# Patient Record
Sex: FEMALE | Race: WHITE | NOT HISPANIC OR LATINO | Employment: OTHER | URBAN - NONMETROPOLITAN AREA
[De-identification: names, ages, dates, MRNs, and addresses within clinical notes are randomized per-mention and may not be internally consistent; named-entity substitution may affect disease eponyms.]

---

## 2017-01-01 ENCOUNTER — APPOINTMENT (OUTPATIENT)
Dept: CARDIOLOGY | Facility: HOSPITAL | Age: 77
End: 2017-01-01
Attending: INTERNAL MEDICINE

## 2017-01-01 ENCOUNTER — HOSPITAL ENCOUNTER (INPATIENT)
Facility: HOSPITAL | Age: 77
LOS: 6 days | End: 2017-12-31
Attending: EMERGENCY MEDICINE | Admitting: INTERNAL MEDICINE

## 2017-01-01 ENCOUNTER — HOSPITAL ENCOUNTER (INPATIENT)
Facility: HOSPITAL | Age: 77
LOS: 6 days | Discharge: NURSING, REHAB OR LONG-TERM CARE | End: 2017-12-22
Attending: EMERGENCY MEDICINE | Admitting: INTERNAL MEDICINE

## 2017-01-01 ENCOUNTER — APPOINTMENT (OUTPATIENT)
Dept: GENERAL RADIOLOGY | Facility: HOSPITAL | Age: 77
End: 2017-01-01

## 2017-01-01 ENCOUNTER — APPOINTMENT (OUTPATIENT)
Dept: CT IMAGING | Facility: HOSPITAL | Age: 77
End: 2017-01-01

## 2017-01-01 VITALS
SYSTOLIC BLOOD PRESSURE: 134 MMHG | DIASTOLIC BLOOD PRESSURE: 61 MMHG | TEMPERATURE: 98.1 F | WEIGHT: 101.2 LBS | HEIGHT: 64 IN | RESPIRATION RATE: 21 BRPM | OXYGEN SATURATION: 91 % | BODY MASS INDEX: 17.28 KG/M2 | HEART RATE: 76 BPM

## 2017-01-01 VITALS
OXYGEN SATURATION: 88 % | BODY MASS INDEX: 17.42 KG/M2 | HEIGHT: 66 IN | HEART RATE: 76 BPM | DIASTOLIC BLOOD PRESSURE: 39 MMHG | RESPIRATION RATE: 16 BRPM | SYSTOLIC BLOOD PRESSURE: 113 MMHG | TEMPERATURE: 98.6 F | WEIGHT: 108.4 LBS

## 2017-01-01 DIAGNOSIS — J44.1 COPD EXACERBATION (HCC): Primary | ICD-10-CM

## 2017-01-01 DIAGNOSIS — R13.12 OROPHARYNGEAL DYSPHAGIA: ICD-10-CM

## 2017-01-01 DIAGNOSIS — J96.01 ACUTE RESPIRATORY FAILURE WITH HYPOXEMIA (HCC): Primary | ICD-10-CM

## 2017-01-01 DIAGNOSIS — Z74.09 IMPAIRED MOBILITY: ICD-10-CM

## 2017-01-01 LAB
ALBUMIN SERPL-MCNC: 3.4 G/DL (ref 3.5–5)
ALBUMIN SERPL-MCNC: 3.5 G/DL (ref 3.5–5)
ALBUMIN SERPL-MCNC: 3.6 G/DL (ref 3.5–5)
ALBUMIN SERPL-MCNC: 3.9 G/DL (ref 3.5–5)
ALBUMIN/GLOB SERPL: 1.2 G/DL (ref 1.1–2.5)
ALBUMIN/GLOB SERPL: 1.2 G/DL (ref 1.1–2.5)
ALBUMIN/GLOB SERPL: 1.3 G/DL (ref 1.1–2.5)
ALBUMIN/GLOB SERPL: 1.4 G/DL (ref 1.1–2.5)
ALP SERPL-CCNC: 55 U/L (ref 24–120)
ALP SERPL-CCNC: 55 U/L (ref 24–120)
ALP SERPL-CCNC: 69 U/L (ref 24–120)
ALP SERPL-CCNC: 78 U/L (ref 24–120)
ALT SERPL W P-5'-P-CCNC: 38 U/L (ref 0–54)
ALT SERPL W P-5'-P-CCNC: 41 U/L (ref 0–54)
ALT SERPL W P-5'-P-CCNC: 45 U/L (ref 0–54)
ALT SERPL W P-5'-P-CCNC: 48 U/L (ref 0–54)
ANION GAP SERPL CALCULATED.3IONS-SCNC: 7 MMOL/L (ref 4–13)
ANION GAP SERPL CALCULATED.3IONS-SCNC: 8 MMOL/L (ref 4–13)
ANION GAP SERPL CALCULATED.3IONS-SCNC: 8 MMOL/L (ref 4–13)
ANION GAP SERPL CALCULATED.3IONS-SCNC: ABNORMAL MMOL/L (ref 4–13)
APTT PPP: 29.5 SECONDS (ref 24.1–34.8)
ARTERIAL PATENCY WRIST A: POSITIVE
AST SERPL-CCNC: 24 U/L (ref 7–45)
AST SERPL-CCNC: 24 U/L (ref 7–45)
AST SERPL-CCNC: 29 U/L (ref 7–45)
AST SERPL-CCNC: 34 U/L (ref 7–45)
ATMOSPHERIC PRESS: 754 MMHG
ATMOSPHERIC PRESS: 754 MMHG
ATMOSPHERIC PRESS: 755 MMHG
ATMOSPHERIC PRESS: 762 MMHG
ATMOSPHERIC PRESS: 764 MMHG
BACTERIA SPEC AEROBE CULT: NORMAL
BACTERIA SPEC RESP CULT: ABNORMAL
BASE EXCESS BLDA CALC-SCNC: 10.1 MMOL/L (ref 0–2)
BASE EXCESS BLDA CALC-SCNC: 18.1 MMOL/L (ref 0–2)
BASE EXCESS BLDA CALC-SCNC: 22.8 MMOL/L (ref 0–2)
BASE EXCESS BLDA CALC-SCNC: 6.1 MMOL/L (ref 0–2)
BASE EXCESS BLDA CALC-SCNC: 6.4 MMOL/L (ref 0–2)
BASOPHILS # BLD AUTO: 0.01 10*3/MM3 (ref 0–0.2)
BASOPHILS # BLD AUTO: 0.04 10*3/MM3 (ref 0–0.2)
BASOPHILS # BLD AUTO: 0.06 10*3/MM3 (ref 0–0.2)
BASOPHILS # BLD AUTO: 0.08 10*3/MM3 (ref 0–0.2)
BASOPHILS NFR BLD AUTO: 0.1 % (ref 0–2)
BASOPHILS NFR BLD AUTO: 0.3 % (ref 0–2)
BASOPHILS NFR BLD AUTO: 0.4 % (ref 0–2)
BASOPHILS NFR BLD AUTO: 0.4 % (ref 0–2)
BDY SITE: ABNORMAL
BH CV ECHO MEAS - AO MAX PG (FULL): 0.38 MMHG
BH CV ECHO MEAS - AO MAX PG: 4 MMHG
BH CV ECHO MEAS - AO MEAN PG (FULL): 0 MMHG
BH CV ECHO MEAS - AO MEAN PG: 2 MMHG
BH CV ECHO MEAS - AO ROOT AREA (BSA CORRECTED): 2.6
BH CV ECHO MEAS - AO ROOT AREA: 10.2 CM^2
BH CV ECHO MEAS - AO ROOT DIAM: 3.6 CM
BH CV ECHO MEAS - AO V2 MAX: 100 CM/SEC
BH CV ECHO MEAS - AO V2 MEAN: 67.3 CM/SEC
BH CV ECHO MEAS - AO V2 VTI: 19.7 CM
BH CV ECHO MEAS - AVA(I,A): 2.4 CM^2
BH CV ECHO MEAS - AVA(I,D): 2.4 CM^2
BH CV ECHO MEAS - AVA(V,A): 2.4 CM^2
BH CV ECHO MEAS - AVA(V,D): 2.4 CM^2
BH CV ECHO MEAS - BSA(HAYCOCK): 1.3 M^2
BH CV ECHO MEAS - BSA: 1.4 M^2
BH CV ECHO MEAS - BZI_BMI: 13.4 KILOGRAMS/M^2
BH CV ECHO MEAS - BZI_METRIC_HEIGHT: 167.6 CM
BH CV ECHO MEAS - BZI_METRIC_WEIGHT: 37.6 KG
BH CV ECHO MEAS - CONTRAST EF 4CH: 73.8 ML/M^2
BH CV ECHO MEAS - EDV(CUBED): 24.4 ML
BH CV ECHO MEAS - EDV(MOD-SP4): 26.7 ML
BH CV ECHO MEAS - EDV(TEICH): 32.2 ML
BH CV ECHO MEAS - EF(CUBED): 76.1 %
BH CV ECHO MEAS - EF(MOD-SP4): 73.8 %
BH CV ECHO MEAS - EF(TEICH): 69.8 %
BH CV ECHO MEAS - ESV(CUBED): 5.8 ML
BH CV ECHO MEAS - ESV(MOD-SP4): 7 ML
BH CV ECHO MEAS - ESV(TEICH): 9.7 ML
BH CV ECHO MEAS - FS: 37.9 %
BH CV ECHO MEAS - IVS/LVPW: 1
BH CV ECHO MEAS - IVSD: 0.9 CM
BH CV ECHO MEAS - LA DIMENSION: 2.8 CM
BH CV ECHO MEAS - LA/AO: 0.78
BH CV ECHO MEAS - LAT PEAK E' VEL: 8.7 CM/SEC
BH CV ECHO MEAS - LV DIASTOLIC VOL/BSA (35-75): 19.4 ML/M^2
BH CV ECHO MEAS - LV MASS(C)D: 66.7 GRAMS
BH CV ECHO MEAS - LV MASS(C)DI: 48.5 GRAMS/M^2
BH CV ECHO MEAS - LV MAX PG: 3.6 MMHG
BH CV ECHO MEAS - LV MEAN PG: 2 MMHG
BH CV ECHO MEAS - LV SYSTOLIC VOL/BSA (12-30): 5.1 ML/M^2
BH CV ECHO MEAS - LV V1 MAX: 95.1 CM/SEC
BH CV ECHO MEAS - LV V1 MEAN: 72 CM/SEC
BH CV ECHO MEAS - LV V1 VTI: 18.4 CM
BH CV ECHO MEAS - LVIDD: 2.9 CM
BH CV ECHO MEAS - LVIDS: 1.8 CM
BH CV ECHO MEAS - LVLD AP4: 6.1 CM
BH CV ECHO MEAS - LVLS AP4: 4.2 CM
BH CV ECHO MEAS - LVOT AREA (M): 2.5 CM^2
BH CV ECHO MEAS - LVOT AREA: 2.5 CM^2
BH CV ECHO MEAS - LVOT DIAM: 1.8 CM
BH CV ECHO MEAS - LVPWD: 0.9 CM
BH CV ECHO MEAS - MED PEAK E' VEL: 5.77 CM/SEC
BH CV ECHO MEAS - MV A MAX VEL: 53 CM/SEC
BH CV ECHO MEAS - MV DEC TIME: 0.24 SEC
BH CV ECHO MEAS - MV E MAX VEL: 70.1 CM/SEC
BH CV ECHO MEAS - MV E/A: 1.3
BH CV ECHO MEAS - PI END-D VEL: 224 CM/SEC
BH CV ECHO MEAS - RAP SYSTOLE: 5 MMHG
BH CV ECHO MEAS - RVSP: 90.7 MMHG
BH CV ECHO MEAS - SI(AO): 145.7 ML/M^2
BH CV ECHO MEAS - SI(CUBED): 13.5 ML/M^2
BH CV ECHO MEAS - SI(LVOT): 34 ML/M^2
BH CV ECHO MEAS - SI(MOD-SP4): 14.3 ML/M^2
BH CV ECHO MEAS - SI(TEICH): 16.3 ML/M^2
BH CV ECHO MEAS - SV(AO): 200.5 ML
BH CV ECHO MEAS - SV(CUBED): 18.6 ML
BH CV ECHO MEAS - SV(LVOT): 46.8 ML
BH CV ECHO MEAS - SV(MOD-SP4): 19.7 ML
BH CV ECHO MEAS - SV(TEICH): 22.5 ML
BH CV ECHO MEAS - TR MAX VEL: 463 CM/SEC
BILIRUB SERPL-MCNC: 0.5 MG/DL (ref 0.1–1)
BILIRUB SERPL-MCNC: 0.9 MG/DL (ref 0.1–1)
BILIRUB SERPL-MCNC: 1 MG/DL (ref 0.1–1)
BILIRUB SERPL-MCNC: 1.4 MG/DL (ref 0.1–1)
BODY TEMPERATURE: 37 C
BUN BLD-MCNC: 17 MG/DL (ref 5–21)
BUN BLD-MCNC: 18 MG/DL (ref 5–21)
BUN BLD-MCNC: 28 MG/DL (ref 5–21)
BUN BLD-MCNC: 34 MG/DL (ref 5–21)
BUN BLD-MCNC: 35 MG/DL (ref 5–21)
BUN BLD-MCNC: 37 MG/DL (ref 5–21)
BUN/CREAT SERPL: 25.8 (ref 7–25)
BUN/CREAT SERPL: 26.9 (ref 7–25)
BUN/CREAT SERPL: 36.8 (ref 7–25)
BUN/CREAT SERPL: 47.4 (ref 7–25)
BUN/CREAT SERPL: 59.3 (ref 7–25)
BUN/CREAT SERPL: 64.2 (ref 7–25)
CALCIUM SPEC-SCNC: 8.7 MG/DL (ref 8.4–10.4)
CALCIUM SPEC-SCNC: 8.7 MG/DL (ref 8.4–10.4)
CALCIUM SPEC-SCNC: 8.8 MG/DL (ref 8.4–10.4)
CALCIUM SPEC-SCNC: 8.8 MG/DL (ref 8.4–10.4)
CALCIUM SPEC-SCNC: 9.3 MG/DL (ref 8.4–10.4)
CALCIUM SPEC-SCNC: 9.5 MG/DL (ref 8.4–10.4)
CHLORIDE SERPL-SCNC: 82 MMOL/L (ref 98–110)
CHLORIDE SERPL-SCNC: 85 MMOL/L (ref 98–110)
CHLORIDE SERPL-SCNC: 88 MMOL/L (ref 98–110)
CHLORIDE SERPL-SCNC: 93 MMOL/L (ref 98–110)
CHLORIDE SERPL-SCNC: 94 MMOL/L (ref 98–110)
CHLORIDE SERPL-SCNC: 96 MMOL/L (ref 98–110)
CK MB SERPL-CCNC: 1.27 NG/ML (ref 0–5)
CO2 SERPL-SCNC: 35 MMOL/L (ref 24–31)
CO2 SERPL-SCNC: 36 MMOL/L (ref 24–31)
CO2 SERPL-SCNC: 39 MMOL/L (ref 24–31)
CO2 SERPL-SCNC: >40 MMOL/L (ref 24–31)
COHGB MFR BLD: 2.5 % (ref 0–5)
CREAT BLD-MCNC: 0.53 MG/DL (ref 0.5–1.4)
CREAT BLD-MCNC: 0.59 MG/DL (ref 0.5–1.4)
CREAT BLD-MCNC: 0.66 MG/DL (ref 0.5–1.4)
CREAT BLD-MCNC: 0.67 MG/DL (ref 0.5–1.4)
CREAT BLD-MCNC: 0.76 MG/DL (ref 0.5–1.4)
CREAT BLD-MCNC: 0.78 MG/DL (ref 0.5–1.4)
D-LACTATE SERPL-SCNC: 1.4 MMOL/L (ref 0.5–2)
D-LACTATE SERPL-SCNC: 1.8 MMOL/L (ref 0.5–2)
DEPRECATED RDW RBC AUTO: 47.8 FL (ref 40–54)
DEPRECATED RDW RBC AUTO: 49.1 FL (ref 40–54)
DEPRECATED RDW RBC AUTO: 51.5 FL (ref 40–54)
DEPRECATED RDW RBC AUTO: 51.9 FL (ref 40–54)
E/E' RATIO: 12.1
EOSINOPHIL # BLD AUTO: 0 10*3/MM3 (ref 0–0.7)
EOSINOPHIL # BLD AUTO: 0.02 10*3/MM3 (ref 0–0.7)
EOSINOPHIL NFR BLD AUTO: 0 % (ref 0–4)
EOSINOPHIL NFR BLD AUTO: 0.2 % (ref 0–4)
EPAP: 6
ERYTHROCYTE [DISTWIDTH] IN BLOOD BY AUTOMATED COUNT: 14 % (ref 12–15)
ERYTHROCYTE [DISTWIDTH] IN BLOOD BY AUTOMATED COUNT: 14.1 % (ref 12–15)
ERYTHROCYTE [DISTWIDTH] IN BLOOD BY AUTOMATED COUNT: 14.5 % (ref 12–15)
ERYTHROCYTE [DISTWIDTH] IN BLOOD BY AUTOMATED COUNT: 14.5 % (ref 12–15)
GAS FLOW AIRWAY: 4.5 LPM
GAS FLOW AIRWAY: 6 LPM
GFR SERPL CREATININE-BSD FRML MDRD: 111 ML/MIN/1.73
GFR SERPL CREATININE-BSD FRML MDRD: 71 ML/MIN/1.73
GFR SERPL CREATININE-BSD FRML MDRD: 73 ML/MIN/1.73
GFR SERPL CREATININE-BSD FRML MDRD: 85 ML/MIN/1.73
GFR SERPL CREATININE-BSD FRML MDRD: 86 ML/MIN/1.73
GFR SERPL CREATININE-BSD FRML MDRD: 98 ML/MIN/1.73
GLOBULIN UR ELPH-MCNC: 2.7 GM/DL
GLOBULIN UR ELPH-MCNC: 2.8 GM/DL
GLOBULIN UR ELPH-MCNC: 2.9 GM/DL
GLOBULIN UR ELPH-MCNC: 3.1 GM/DL
GLUCOSE BLD-MCNC: 123 MG/DL (ref 70–100)
GLUCOSE BLD-MCNC: 130 MG/DL (ref 70–100)
GLUCOSE BLD-MCNC: 145 MG/DL (ref 70–100)
GLUCOSE BLD-MCNC: 154 MG/DL (ref 70–100)
GLUCOSE BLD-MCNC: 169 MG/DL (ref 70–100)
GLUCOSE BLD-MCNC: 177 MG/DL (ref 70–100)
GRAM STN SPEC: ABNORMAL
HCO3 BLDA-SCNC: 33 MMOL/L (ref 20–26)
HCO3 BLDA-SCNC: 33.1 MMOL/L (ref 20–26)
HCO3 BLDA-SCNC: 38.4 MMOL/L (ref 20–26)
HCO3 BLDA-SCNC: 46.8 MMOL/L (ref 20–26)
HCO3 BLDA-SCNC: 50.1 MMOL/L (ref 20–26)
HCT VFR BLD AUTO: 37.1 % (ref 37–47)
HCT VFR BLD AUTO: 38 % (ref 37–47)
HCT VFR BLD AUTO: 42 % (ref 37–47)
HCT VFR BLD AUTO: 43 % (ref 37–47)
HCT VFR BLD CALC: 45.3 % (ref 38–51)
HGB BLD-MCNC: 12 G/DL (ref 12–16)
HGB BLD-MCNC: 13 G/DL (ref 12–16)
HGB BLD-MCNC: 13.8 G/DL (ref 12–16)
HGB BLD-MCNC: 14.8 G/DL (ref 12–16)
HGB BLDA-MCNC: 14.8 G/DL (ref 13.5–17.5)
HOLD SPECIMEN: NORMAL
HOLD SPECIMEN: NORMAL
HOROWITZ INDEX BLD+IHG-RTO: 35 %
HOROWITZ INDEX BLD+IHG-RTO: 50 %
HOROWITZ INDEX BLD+IHG-RTO: 70 %
IMM GRANULOCYTES # BLD: 0.04 10*3/MM3 (ref 0–0.03)
IMM GRANULOCYTES # BLD: 0.05 10*3/MM3 (ref 0–0.03)
IMM GRANULOCYTES # BLD: 0.11 10*3/MM3 (ref 0–0.03)
IMM GRANULOCYTES # BLD: 0.15 10*3/MM3 (ref 0–0.03)
IMM GRANULOCYTES NFR BLD: 0.4 % (ref 0–5)
IMM GRANULOCYTES NFR BLD: 0.5 % (ref 0–5)
IMM GRANULOCYTES NFR BLD: 0.6 % (ref 0–5)
IMM GRANULOCYTES NFR BLD: 0.7 % (ref 0–5)
INR PPP: 0.99 (ref 0.91–1.09)
INR PPP: 1.04 (ref 0.91–1.09)
IPAP: 12
LEFT ATRIUM VOLUME INDEX: 23.2 ML/M2
LEFT ATRIUM VOLUME: 32 CM3
LV EF 2D ECHO EST: 65 %
LYMPHOCYTES # BLD AUTO: 0.1 10*3/MM3 (ref 0.72–4.86)
LYMPHOCYTES # BLD AUTO: 0.18 10*3/MM3 (ref 0.72–4.86)
LYMPHOCYTES # BLD AUTO: 0.3 10*3/MM3 (ref 0.72–4.86)
LYMPHOCYTES # BLD AUTO: 0.65 10*3/MM3 (ref 0.72–4.86)
LYMPHOCYTES NFR BLD AUTO: 0.5 % (ref 15–45)
LYMPHOCYTES NFR BLD AUTO: 2.2 % (ref 15–45)
LYMPHOCYTES NFR BLD AUTO: 2.7 % (ref 15–45)
LYMPHOCYTES NFR BLD AUTO: 2.9 % (ref 15–45)
Lab: ABNORMAL
MAGNESIUM SERPL-MCNC: 1.6 MG/DL (ref 1.4–2.2)
MAXIMAL PREDICTED HEART RATE: 141 BPM
MCH RBC QN AUTO: 31.6 PG (ref 28–32)
MCH RBC QN AUTO: 31.7 PG (ref 28–32)
MCH RBC QN AUTO: 32.2 PG (ref 28–32)
MCH RBC QN AUTO: 32.7 PG (ref 28–32)
MCHC RBC AUTO-ENTMCNC: 32.3 G/DL (ref 33–36)
MCHC RBC AUTO-ENTMCNC: 32.9 G/DL (ref 33–36)
MCHC RBC AUTO-ENTMCNC: 34.2 G/DL (ref 33–36)
MCHC RBC AUTO-ENTMCNC: 34.4 G/DL (ref 33–36)
MCV RBC AUTO: 93.5 FL (ref 82–98)
MCV RBC AUTO: 95.5 FL (ref 82–98)
MCV RBC AUTO: 96.6 FL (ref 82–98)
MCV RBC AUTO: 97.6 FL (ref 82–98)
METHGB BLD QL: 0.7 % (ref 0–3)
MODALITY: ABNORMAL
MONOCYTES # BLD AUTO: 0.21 10*3/MM3 (ref 0.19–1.3)
MONOCYTES # BLD AUTO: 0.38 10*3/MM3 (ref 0.19–1.3)
MONOCYTES # BLD AUTO: 0.73 10*3/MM3 (ref 0.19–1.3)
MONOCYTES # BLD AUTO: 1.37 10*3/MM3 (ref 0.19–1.3)
MONOCYTES NFR BLD AUTO: 1.8 % (ref 4–12)
MONOCYTES NFR BLD AUTO: 2.6 % (ref 4–12)
MONOCYTES NFR BLD AUTO: 6.1 % (ref 4–12)
MONOCYTES NFR BLD AUTO: 6.7 % (ref 4–12)
NEUTROPHILS # BLD AUTO: 20.09 10*3/MM3 (ref 1.87–8.4)
NEUTROPHILS # BLD AUTO: 20.24 10*3/MM3 (ref 1.87–8.4)
NEUTROPHILS # BLD AUTO: 7.73 10*3/MM3 (ref 1.87–8.4)
NEUTROPHILS # BLD AUTO: 9.81 10*3/MM3 (ref 1.87–8.4)
NEUTROPHILS NFR BLD AUTO: 89.6 % (ref 39–78)
NEUTROPHILS NFR BLD AUTO: 89.9 % (ref 39–78)
NEUTROPHILS NFR BLD AUTO: 94.5 % (ref 39–78)
NEUTROPHILS NFR BLD AUTO: 96.9 % (ref 39–78)
NOTIFIED BY: ABNORMAL
NOTIFIED WHO: ABNORMAL
NRBC BLD MANUAL-RTO: 0 /100 WBC (ref 0–0)
NT-PROBNP SERPL-MCNC: 3880 PG/ML (ref 0–1800)
NT-PROBNP SERPL-MCNC: 4670 PG/ML (ref 0–1800)
OXYHGB MFR BLDV: 71.8 % (ref 94–99)
PCO2 BLDA: 54 MM HG (ref 35–45)
PCO2 BLDA: 56.5 MM HG (ref 35–45)
PCO2 BLDA: 61.6 MM HG (ref 35–45)
PCO2 BLDA: 71.1 MM HG (ref 35–45)
PCO2 BLDA: 71.4 MM HG (ref 35–45)
PH BLDA: 7.34 PH UNITS (ref 7.35–7.45)
PH BLDA: 7.38 PH UNITS (ref 7.35–7.45)
PH BLDA: 7.39 PH UNITS (ref 7.35–7.45)
PH BLDA: 7.42 PH UNITS (ref 7.35–7.45)
PH BLDA: 7.52 PH UNITS (ref 7.35–7.45)
PHOSPHATE SERPL-MCNC: 3.3 MG/DL (ref 2.5–4.5)
PLATELET # BLD AUTO: 121 10*3/MM3 (ref 130–400)
PLATELET # BLD AUTO: 148 10*3/MM3 (ref 130–400)
PLATELET # BLD AUTO: 166 10*3/MM3 (ref 130–400)
PLATELET # BLD AUTO: 210 10*3/MM3 (ref 130–400)
PMV BLD AUTO: 10.7 FL (ref 6–12)
PMV BLD AUTO: 10.8 FL (ref 6–12)
PMV BLD AUTO: 10.9 FL (ref 6–12)
PMV BLD AUTO: 11 FL (ref 6–12)
PO2 BLDA: 38.8 MM HG (ref 83–108)
PO2 BLDA: 52.7 MM HG (ref 83–108)
PO2 BLDA: 72.5 MM HG (ref 83–108)
PO2 BLDA: 74.5 MM HG (ref 83–108)
PO2 BLDA: 77.9 MM HG (ref 83–108)
POTASSIUM BLD-SCNC: 3.6 MMOL/L (ref 3.5–5.3)
POTASSIUM BLD-SCNC: 3.9 MMOL/L (ref 3.5–5.3)
POTASSIUM BLD-SCNC: 4.1 MMOL/L (ref 3.5–5.3)
POTASSIUM BLD-SCNC: 4.2 MMOL/L (ref 3.5–5.3)
POTASSIUM BLD-SCNC: 4.7 MMOL/L (ref 3.5–5.3)
POTASSIUM BLD-SCNC: 4.9 MMOL/L (ref 3.5–5.3)
POTASSIUM BLDA-SCNC: 3.8 MMOL/L (ref 3.5–5.2)
PROT SERPL-MCNC: 6.2 G/DL (ref 6.3–8.7)
PROT SERPL-MCNC: 6.3 G/DL (ref 6.3–8.7)
PROT SERPL-MCNC: 6.7 G/DL (ref 6.3–8.7)
PROT SERPL-MCNC: 6.7 G/DL (ref 6.3–8.7)
PROTHROMBIN TIME: 13.4 SECONDS (ref 11.9–14.6)
PROTHROMBIN TIME: 13.9 SECONDS (ref 11.9–14.6)
RBC # BLD AUTO: 3.8 10*6/MM3 (ref 4.2–5.4)
RBC # BLD AUTO: 3.98 10*6/MM3 (ref 4.2–5.4)
RBC # BLD AUTO: 4.35 10*6/MM3 (ref 4.2–5.4)
RBC # BLD AUTO: 4.6 10*6/MM3 (ref 4.2–5.4)
SAO2 % BLDCOA: 74.2 % (ref 94–99)
SAO2 % BLDCOA: 85.2 % (ref 94–99)
SAO2 % BLDCOA: 94.5 % (ref 94–99)
SAO2 % BLDCOA: 94.8 % (ref 94–99)
SAO2 % BLDCOA: 95.8 % (ref 94–99)
SET MECH RESP RATE: 12
SET MECH RESP RATE: 14
SET MECH RESP RATE: 14
SODIUM BLD-SCNC: 136 MMOL/L (ref 135–145)
SODIUM BLD-SCNC: 138 MMOL/L (ref 135–145)
SODIUM BLD-SCNC: 139 MMOL/L (ref 135–145)
SODIUM BLD-SCNC: 139 MMOL/L (ref 135–145)
SODIUM BLD-SCNC: 141 MMOL/L (ref 135–145)
SODIUM BLD-SCNC: 142 MMOL/L (ref 135–145)
SODIUM BLDA-SCNC: 136 MMOL/L (ref 136–145)
STRESS TARGET HR: 120 BPM
TROPONIN I SERPL-MCNC: 0.05 NG/ML (ref 0–0.03)
TROPONIN I SERPL-MCNC: 0.07 NG/ML (ref 0–0.03)
VENTILATOR MODE: ABNORMAL
WBC NRBC COR # BLD: 10.94 10*3/MM3 (ref 4.8–10.8)
WBC NRBC COR # BLD: 20.74 10*3/MM3 (ref 4.8–10.8)
WBC NRBC COR # BLD: 22.49 10*3/MM3 (ref 4.8–10.8)
WBC NRBC COR # BLD: 8.18 10*3/MM3 (ref 4.8–10.8)
WHOLE BLOOD HOLD SPECIMEN: NORMAL
WHOLE BLOOD HOLD SPECIMEN: NORMAL

## 2017-01-01 PROCEDURE — 94660 CPAP INITIATION&MGMT: CPT

## 2017-01-01 PROCEDURE — 94799 UNLISTED PULMONARY SVC/PX: CPT

## 2017-01-01 PROCEDURE — 25010000002 ENOXAPARIN PER 10 MG: Performed by: FAMILY MEDICINE

## 2017-01-01 PROCEDURE — 94640 AIRWAY INHALATION TREATMENT: CPT

## 2017-01-01 PROCEDURE — 82803 BLOOD GASES ANY COMBINATION: CPT

## 2017-01-01 PROCEDURE — 87205 SMEAR GRAM STAIN: CPT | Performed by: INTERNAL MEDICINE

## 2017-01-01 PROCEDURE — 97161 PT EVAL LOW COMPLEX 20 MIN: CPT

## 2017-01-01 PROCEDURE — 83735 ASSAY OF MAGNESIUM: CPT | Performed by: FAMILY MEDICINE

## 2017-01-01 PROCEDURE — 97116 GAIT TRAINING THERAPY: CPT

## 2017-01-01 PROCEDURE — 87040 BLOOD CULTURE FOR BACTERIA: CPT | Performed by: EMERGENCY MEDICINE

## 2017-01-01 PROCEDURE — 93010 ELECTROCARDIOGRAM REPORT: CPT | Performed by: INTERNAL MEDICINE

## 2017-01-01 PROCEDURE — 85610 PROTHROMBIN TIME: CPT | Performed by: EMERGENCY MEDICINE

## 2017-01-01 PROCEDURE — 25010000002 METHYLPREDNISOLONE PER 125 MG: Performed by: FAMILY MEDICINE

## 2017-01-01 PROCEDURE — 25010000002 PIPERACILLIN SOD-TAZOBACTAM PER 1 G: Performed by: FAMILY MEDICINE

## 2017-01-01 PROCEDURE — 93005 ELECTROCARDIOGRAM TRACING: CPT | Performed by: INTERNAL MEDICINE

## 2017-01-01 PROCEDURE — 84100 ASSAY OF PHOSPHORUS: CPT | Performed by: FAMILY MEDICINE

## 2017-01-01 PROCEDURE — 94760 N-INVAS EAR/PLS OXIMETRY 1: CPT

## 2017-01-01 PROCEDURE — 25010000002 METHYLPREDNISOLONE PER 40 MG: Performed by: NURSE PRACTITIONER

## 2017-01-01 PROCEDURE — G8978 MOBILITY CURRENT STATUS: HCPCS

## 2017-01-01 PROCEDURE — 25010000002 ONDANSETRON PER 1 MG: Performed by: INTERNAL MEDICINE

## 2017-01-01 PROCEDURE — 25010000002 ENOXAPARIN PER 10 MG: Performed by: INTERNAL MEDICINE

## 2017-01-01 PROCEDURE — 80053 COMPREHEN METABOLIC PANEL: CPT | Performed by: EMERGENCY MEDICINE

## 2017-01-01 PROCEDURE — 83880 ASSAY OF NATRIURETIC PEPTIDE: CPT | Performed by: INTERNAL MEDICINE

## 2017-01-01 PROCEDURE — 84484 ASSAY OF TROPONIN QUANT: CPT | Performed by: EMERGENCY MEDICINE

## 2017-01-01 PROCEDURE — 36600 WITHDRAWAL OF ARTERIAL BLOOD: CPT

## 2017-01-01 PROCEDURE — 87070 CULTURE OTHR SPECIMN AEROBIC: CPT | Performed by: INTERNAL MEDICINE

## 2017-01-01 PROCEDURE — 25010000002 METHYLPREDNISOLONE PER 125 MG: Performed by: INTERNAL MEDICINE

## 2017-01-01 PROCEDURE — 80048 BASIC METABOLIC PNL TOTAL CA: CPT | Performed by: INTERNAL MEDICINE

## 2017-01-01 PROCEDURE — 80053 COMPREHEN METABOLIC PANEL: CPT | Performed by: INTERNAL MEDICINE

## 2017-01-01 PROCEDURE — G8997 SWALLOW GOAL STATUS: HCPCS | Performed by: SPEECH-LANGUAGE PATHOLOGIST

## 2017-01-01 PROCEDURE — 80053 COMPREHEN METABOLIC PANEL: CPT | Performed by: FAMILY MEDICINE

## 2017-01-01 PROCEDURE — 25010000002 METHYLPREDNISOLONE PER 40 MG: Performed by: FAMILY MEDICINE

## 2017-01-01 PROCEDURE — 97110 THERAPEUTIC EXERCISES: CPT

## 2017-01-01 PROCEDURE — 93005 ELECTROCARDIOGRAM TRACING: CPT | Performed by: EMERGENCY MEDICINE

## 2017-01-01 PROCEDURE — 25010000002 METHYLPREDNISOLONE PER 125 MG: Performed by: NURSE PRACTITIONER

## 2017-01-01 PROCEDURE — 83605 ASSAY OF LACTIC ACID: CPT | Performed by: EMERGENCY MEDICINE

## 2017-01-01 PROCEDURE — 25010000002 FUROSEMIDE PER 20 MG: Performed by: INTERNAL MEDICINE

## 2017-01-01 PROCEDURE — G8979 MOBILITY GOAL STATUS: HCPCS

## 2017-01-01 PROCEDURE — 85025 COMPLETE CBC W/AUTO DIFF WBC: CPT | Performed by: EMERGENCY MEDICINE

## 2017-01-01 PROCEDURE — 82375 ASSAY CARBOXYHB QUANT: CPT

## 2017-01-01 PROCEDURE — 85025 COMPLETE CBC W/AUTO DIFF WBC: CPT | Performed by: INTERNAL MEDICINE

## 2017-01-01 PROCEDURE — 82805 BLOOD GASES W/O2 SATURATION: CPT

## 2017-01-01 PROCEDURE — 99285 EMERGENCY DEPT VISIT HI MDM: CPT

## 2017-01-01 PROCEDURE — G8996 SWALLOW CURRENT STATUS: HCPCS | Performed by: SPEECH-LANGUAGE PATHOLOGIST

## 2017-01-01 PROCEDURE — 87077 CULTURE AEROBIC IDENTIFY: CPT | Performed by: INTERNAL MEDICINE

## 2017-01-01 PROCEDURE — 92610 EVALUATE SWALLOWING FUNCTION: CPT | Performed by: SPEECH-LANGUAGE PATHOLOGIST

## 2017-01-01 PROCEDURE — 25010000002 METHYLPREDNISOLONE PER 125 MG: Performed by: EMERGENCY MEDICINE

## 2017-01-01 PROCEDURE — 94644 CONT INHLJ TX 1ST HOUR: CPT

## 2017-01-01 PROCEDURE — 5A09457 ASSISTANCE WITH RESPIRATORY VENTILATION, 24-96 CONSECUTIVE HOURS, CONTINUOUS POSITIVE AIRWAY PRESSURE: ICD-10-PCS | Performed by: EMERGENCY MEDICINE

## 2017-01-01 PROCEDURE — 85025 COMPLETE CBC W/AUTO DIFF WBC: CPT | Performed by: FAMILY MEDICINE

## 2017-01-01 PROCEDURE — 93306 TTE W/DOPPLER COMPLETE: CPT | Performed by: INTERNAL MEDICINE

## 2017-01-01 PROCEDURE — 87186 SC STD MICRODIL/AGAR DIL: CPT | Performed by: INTERNAL MEDICINE

## 2017-01-01 PROCEDURE — 83050 HGB METHEMOGLOBIN QUAN: CPT

## 2017-01-01 PROCEDURE — 82553 CREATINE MB FRACTION: CPT | Performed by: EMERGENCY MEDICINE

## 2017-01-01 PROCEDURE — 93005 ELECTROCARDIOGRAM TRACING: CPT

## 2017-01-01 PROCEDURE — 71010 HC CHEST PA OR AP: CPT

## 2017-01-01 PROCEDURE — 85730 THROMBOPLASTIN TIME PARTIAL: CPT | Performed by: EMERGENCY MEDICINE

## 2017-01-01 PROCEDURE — 84484 ASSAY OF TROPONIN QUANT: CPT | Performed by: INTERNAL MEDICINE

## 2017-01-01 PROCEDURE — 93306 TTE W/DOPPLER COMPLETE: CPT

## 2017-01-01 PROCEDURE — 25010000002 PIPERACILLIN SOD-TAZOBACTAM PER 1 G: Performed by: INTERNAL MEDICINE

## 2017-01-01 RX ORDER — METHYLPREDNISOLONE SODIUM SUCCINATE 40 MG/ML
20 INJECTION, POWDER, LYOPHILIZED, FOR SOLUTION INTRAMUSCULAR; INTRAVENOUS EVERY 12 HOURS
Status: DISCONTINUED | OUTPATIENT
Start: 2017-01-01 | End: 2017-01-01 | Stop reason: HOSPADM

## 2017-01-01 RX ORDER — FUROSEMIDE 20 MG/1
20 TABLET ORAL 2 TIMES DAILY
Start: 2017-01-01

## 2017-01-01 RX ORDER — GUAIFENESIN 600 MG/1
1200 TABLET, EXTENDED RELEASE ORAL 2 TIMES DAILY
Status: DISCONTINUED | OUTPATIENT
Start: 2017-01-01 | End: 2017-01-01 | Stop reason: HOSPADM

## 2017-01-01 RX ORDER — BUSPIRONE HYDROCHLORIDE 5 MG/1
5 TABLET ORAL 3 TIMES DAILY
Status: DISCONTINUED | OUTPATIENT
Start: 2017-01-01 | End: 2017-01-01 | Stop reason: HOSPADM

## 2017-01-01 RX ORDER — SODIUM CHLORIDE 0.9 % (FLUSH) 0.9 %
1-10 SYRINGE (ML) INJECTION AS NEEDED
Status: DISCONTINUED | OUTPATIENT
Start: 2017-01-01 | End: 2017-01-01 | Stop reason: HOSPADM

## 2017-01-01 RX ORDER — BENZONATATE 100 MG/1
100 CAPSULE ORAL 3 TIMES DAILY PRN
Status: DISCONTINUED | OUTPATIENT
Start: 2017-01-01 | End: 2017-01-01 | Stop reason: HOSPADM

## 2017-01-01 RX ORDER — GUAIFENESIN 600 MG/1
1200 TABLET, EXTENDED RELEASE ORAL 2 TIMES DAILY
COMMUNITY

## 2017-01-01 RX ORDER — PANTOPRAZOLE SODIUM 40 MG/1
40 TABLET, DELAYED RELEASE ORAL EVERY MORNING
Status: DISCONTINUED | OUTPATIENT
Start: 2017-01-01 | End: 2017-01-01 | Stop reason: HOSPADM

## 2017-01-01 RX ORDER — BISACODYL 5 MG/1
5 TABLET, DELAYED RELEASE ORAL DAILY PRN
Status: DISCONTINUED | OUTPATIENT
Start: 2017-01-01 | End: 2017-01-01 | Stop reason: HOSPADM

## 2017-01-01 RX ORDER — FUROSEMIDE 20 MG/1
20 TABLET ORAL 2 TIMES DAILY
Status: DISCONTINUED | OUTPATIENT
Start: 2017-01-01 | End: 2017-01-01 | Stop reason: HOSPADM

## 2017-01-01 RX ORDER — METHYLPREDNISOLONE SODIUM SUCCINATE 125 MG/2ML
60 INJECTION, POWDER, LYOPHILIZED, FOR SOLUTION INTRAMUSCULAR; INTRAVENOUS EVERY 6 HOURS
Status: DISCONTINUED | OUTPATIENT
Start: 2017-01-01 | End: 2017-01-01

## 2017-01-01 RX ORDER — SODIUM CHLORIDE 9 MG/ML
75 INJECTION, SOLUTION INTRAVENOUS CONTINUOUS
Status: DISCONTINUED | OUTPATIENT
Start: 2017-01-01 | End: 2017-01-01 | Stop reason: HOSPADM

## 2017-01-01 RX ORDER — AZITHROMYCIN 250 MG/1
250 TABLET, FILM COATED ORAL DAILY
COMMUNITY
End: 2017-01-01 | Stop reason: HOSPADM

## 2017-01-01 RX ORDER — BUDESONIDE AND FORMOTEROL FUMARATE DIHYDRATE 80; 4.5 UG/1; UG/1
2 AEROSOL RESPIRATORY (INHALATION)
Status: DISCONTINUED | OUTPATIENT
Start: 2017-01-01 | End: 2017-01-01 | Stop reason: HOSPADM

## 2017-01-01 RX ORDER — IPRATROPIUM BROMIDE AND ALBUTEROL SULFATE 2.5; .5 MG/3ML; MG/3ML
6 SOLUTION RESPIRATORY (INHALATION) ONCE
Status: COMPLETED | OUTPATIENT
Start: 2017-01-01 | End: 2017-01-01

## 2017-01-01 RX ORDER — OMEPRAZOLE 20 MG/1
20 CAPSULE, DELAYED RELEASE ORAL DAILY
COMMUNITY

## 2017-01-01 RX ORDER — ALBUTEROL SULFATE 2.5 MG/3ML
2.5 SOLUTION RESPIRATORY (INHALATION) EVERY 4 HOURS PRN
Status: DISCONTINUED | OUTPATIENT
Start: 2017-01-01 | End: 2017-01-01 | Stop reason: HOSPADM

## 2017-01-01 RX ORDER — ONDANSETRON 2 MG/ML
4 INJECTION INTRAMUSCULAR; INTRAVENOUS EVERY 6 HOURS PRN
Status: DISCONTINUED | OUTPATIENT
Start: 2017-01-01 | End: 2017-01-01 | Stop reason: HOSPADM

## 2017-01-01 RX ORDER — ACETAMINOPHEN 325 MG/1
650 TABLET ORAL EVERY 6 HOURS PRN
Status: DISCONTINUED | OUTPATIENT
Start: 2017-01-01 | End: 2017-01-01 | Stop reason: HOSPADM

## 2017-01-01 RX ORDER — SODIUM CHLORIDE 0.9 % (FLUSH) 0.9 %
10 SYRINGE (ML) INJECTION AS NEEDED
Status: DISCONTINUED | OUTPATIENT
Start: 2017-01-01 | End: 2017-01-01 | Stop reason: HOSPADM

## 2017-01-01 RX ORDER — PREDNISONE 10 MG/1
TABLET ORAL
Status: ON HOLD
Start: 2017-01-01 | End: 2017-01-01

## 2017-01-01 RX ORDER — ALBUTEROL SULFATE 90 UG/1
2 AEROSOL, METERED RESPIRATORY (INHALATION) EVERY 4 HOURS PRN
COMMUNITY
End: 2017-01-01 | Stop reason: HOSPADM

## 2017-01-01 RX ORDER — ALBUTEROL SULFATE 2.5 MG/3ML
10 SOLUTION RESPIRATORY (INHALATION) CONTINUOUS
Status: DISCONTINUED | OUTPATIENT
Start: 2017-01-01 | End: 2017-01-01 | Stop reason: SDUPTHER

## 2017-01-01 RX ORDER — ALBUTEROL SULFATE 2.5 MG/3ML
10 SOLUTION RESPIRATORY (INHALATION) CONTINUOUS
Status: DISPENSED | OUTPATIENT
Start: 2017-01-01 | End: 2017-01-01

## 2017-01-01 RX ORDER — FAMOTIDINE 20 MG/1
40 TABLET, FILM COATED ORAL DAILY
Status: DISCONTINUED | OUTPATIENT
Start: 2017-01-01 | End: 2017-01-01 | Stop reason: HOSPADM

## 2017-01-01 RX ORDER — BUSPIRONE HYDROCHLORIDE 5 MG/1
5 TABLET ORAL 3 TIMES DAILY
COMMUNITY

## 2017-01-01 RX ORDER — FAMOTIDINE 10 MG/ML
10 INJECTION, SOLUTION INTRAVENOUS EVERY 12 HOURS SCHEDULED
Status: DISCONTINUED | OUTPATIENT
Start: 2017-01-01 | End: 2017-01-01 | Stop reason: SDUPTHER

## 2017-01-01 RX ORDER — IPRATROPIUM BROMIDE AND ALBUTEROL SULFATE 2.5; .5 MG/3ML; MG/3ML
3 SOLUTION RESPIRATORY (INHALATION)
Status: DISCONTINUED | OUTPATIENT
Start: 2017-01-01 | End: 2017-01-01

## 2017-01-01 RX ORDER — FUROSEMIDE 10 MG/ML
40 INJECTION INTRAMUSCULAR; INTRAVENOUS ONCE
Status: COMPLETED | OUTPATIENT
Start: 2017-01-01 | End: 2017-01-01

## 2017-01-01 RX ORDER — ACETAMINOPHEN 325 MG/1
650 TABLET ORAL EVERY 4 HOURS PRN
Status: DISCONTINUED | OUTPATIENT
Start: 2017-01-01 | End: 2017-01-01 | Stop reason: HOSPADM

## 2017-01-01 RX ORDER — AMOXICILLIN AND CLAVULANATE POTASSIUM 500; 125 MG/1; MG/1
1 TABLET, FILM COATED ORAL EVERY 12 HOURS SCHEDULED
Status: COMPLETED | OUTPATIENT
Start: 2017-01-01 | End: 2017-01-01

## 2017-01-01 RX ORDER — METHYLPREDNISOLONE SODIUM SUCCINATE 40 MG/ML
40 INJECTION, POWDER, LYOPHILIZED, FOR SOLUTION INTRAMUSCULAR; INTRAVENOUS EVERY 12 HOURS
Status: DISCONTINUED | OUTPATIENT
Start: 2017-01-01 | End: 2017-01-01

## 2017-01-01 RX ORDER — METHYLPREDNISOLONE SODIUM SUCCINATE 125 MG/2ML
60 INJECTION, POWDER, LYOPHILIZED, FOR SOLUTION INTRAMUSCULAR; INTRAVENOUS EVERY 6 HOURS
Status: DISCONTINUED | OUTPATIENT
Start: 2017-01-01 | End: 2017-01-01 | Stop reason: HOSPADM

## 2017-01-01 RX ORDER — ACETAMINOPHEN 650 MG/1
650 SUPPOSITORY RECTAL EVERY 4 HOURS PRN
Status: DISCONTINUED | OUTPATIENT
Start: 2017-01-01 | End: 2017-01-01 | Stop reason: HOSPADM

## 2017-01-01 RX ORDER — FUROSEMIDE 10 MG/ML
40 INJECTION INTRAMUSCULAR; INTRAVENOUS ONCE
Status: DISCONTINUED | OUTPATIENT
Start: 2017-01-01 | End: 2017-01-01 | Stop reason: HOSPADM

## 2017-01-01 RX ORDER — IPRATROPIUM BROMIDE AND ALBUTEROL SULFATE 2.5; .5 MG/3ML; MG/3ML
3 SOLUTION RESPIRATORY (INHALATION) EVERY 4 HOURS PRN
Status: DISCONTINUED | OUTPATIENT
Start: 2017-01-01 | End: 2017-01-01 | Stop reason: HOSPADM

## 2017-01-01 RX ORDER — IPRATROPIUM BROMIDE AND ALBUTEROL SULFATE 2.5; .5 MG/3ML; MG/3ML
3 SOLUTION RESPIRATORY (INHALATION)
Status: DISCONTINUED | OUTPATIENT
Start: 2017-01-01 | End: 2017-01-01 | Stop reason: HOSPADM

## 2017-01-01 RX ORDER — ALBUTEROL SULFATE 2.5 MG/3ML
2.5 SOLUTION RESPIRATORY (INHALATION) EVERY 4 HOURS PRN
COMMUNITY

## 2017-01-01 RX ORDER — METHYLPREDNISOLONE SODIUM SUCCINATE 125 MG/2ML
125 INJECTION, POWDER, LYOPHILIZED, FOR SOLUTION INTRAMUSCULAR; INTRAVENOUS ONCE
Status: COMPLETED | OUTPATIENT
Start: 2017-01-01 | End: 2017-01-01

## 2017-01-01 RX ORDER — METHYLPREDNISOLONE SODIUM SUCCINATE 40 MG/ML
40 INJECTION, POWDER, LYOPHILIZED, FOR SOLUTION INTRAMUSCULAR; INTRAVENOUS EVERY 8 HOURS
Status: DISCONTINUED | OUTPATIENT
Start: 2017-01-01 | End: 2017-01-01

## 2017-01-01 RX ORDER — GUAIFENESIN 600 MG/1
1200 TABLET, EXTENDED RELEASE ORAL EVERY 12 HOURS SCHEDULED
Status: DISCONTINUED | OUTPATIENT
Start: 2017-01-01 | End: 2017-01-01 | Stop reason: HOSPADM

## 2017-01-01 RX ADMIN — BUSPIRONE HYDROCHLORIDE 5 MG: 5 TABLET ORAL at 18:03

## 2017-01-01 RX ADMIN — METHYLPREDNISOLONE SODIUM SUCCINATE 60 MG: 125 INJECTION, POWDER, FOR SOLUTION INTRAMUSCULAR; INTRAVENOUS at 15:43

## 2017-01-01 RX ADMIN — PANTOPRAZOLE SODIUM 40 MG: 40 TABLET, DELAYED RELEASE ORAL at 05:30

## 2017-01-01 RX ADMIN — TAZOBACTAM SODIUM AND PIPERACILLIN SODIUM 3.38 G: 375; 3 INJECTION, SOLUTION INTRAVENOUS at 21:53

## 2017-01-01 RX ADMIN — GUAIFENESIN 1200 MG: 600 TABLET, EXTENDED RELEASE ORAL at 08:48

## 2017-01-01 RX ADMIN — METHYLPREDNISOLONE SODIUM SUCCINATE 60 MG: 125 INJECTION, POWDER, FOR SOLUTION INTRAMUSCULAR; INTRAVENOUS at 01:31

## 2017-01-01 RX ADMIN — AMOXICILLIN AND CLAVULANATE POTASSIUM 500 MG: 500; 125 TABLET, FILM COATED ORAL at 09:10

## 2017-01-01 RX ADMIN — METHYLPREDNISOLONE SODIUM SUCCINATE 60 MG: 125 INJECTION, POWDER, FOR SOLUTION INTRAMUSCULAR; INTRAVENOUS at 03:10

## 2017-01-01 RX ADMIN — BUDESONIDE AND FORMOTEROL FUMARATE DIHYDRATE 2 PUFF: 80; 4.5 AEROSOL RESPIRATORY (INHALATION) at 20:24

## 2017-01-01 RX ADMIN — IPRATROPIUM BROMIDE AND ALBUTEROL SULFATE 3 ML: 2.5; .5 SOLUTION RESPIRATORY (INHALATION) at 11:36

## 2017-01-01 RX ADMIN — IPRATROPIUM BROMIDE AND ALBUTEROL SULFATE 3 ML: 2.5; .5 SOLUTION RESPIRATORY (INHALATION) at 19:53

## 2017-01-01 RX ADMIN — METHYLPREDNISOLONE SODIUM SUCCINATE 20 MG: 40 INJECTION, POWDER, FOR SOLUTION INTRAMUSCULAR; INTRAVENOUS at 14:14

## 2017-01-01 RX ADMIN — GUAIFENESIN 1200 MG: 600 TABLET, EXTENDED RELEASE ORAL at 18:29

## 2017-01-01 RX ADMIN — GUAIFENESIN 1200 MG: 600 TABLET, EXTENDED RELEASE ORAL at 08:43

## 2017-01-01 RX ADMIN — FAMOTIDINE 40 MG: 20 TABLET ORAL at 08:47

## 2017-01-01 RX ADMIN — BUSPIRONE HYDROCHLORIDE 5 MG: 5 TABLET ORAL at 10:04

## 2017-01-01 RX ADMIN — FUROSEMIDE 40 MG: 10 INJECTION, SOLUTION INTRAMUSCULAR; INTRAVENOUS at 06:40

## 2017-01-01 RX ADMIN — METHYLPREDNISOLONE SODIUM SUCCINATE 60 MG: 125 INJECTION, POWDER, FOR SOLUTION INTRAMUSCULAR; INTRAVENOUS at 14:23

## 2017-01-01 RX ADMIN — FUROSEMIDE 20 MG: 20 TABLET ORAL at 20:24

## 2017-01-01 RX ADMIN — METHYLPREDNISOLONE SODIUM SUCCINATE 40 MG: 40 INJECTION, POWDER, LYOPHILIZED, FOR SOLUTION INTRAMUSCULAR; INTRAVENOUS at 13:02

## 2017-01-01 RX ADMIN — METHYLPREDNISOLONE SODIUM SUCCINATE 60 MG: 125 INJECTION, POWDER, FOR SOLUTION INTRAMUSCULAR; INTRAVENOUS at 13:37

## 2017-01-01 RX ADMIN — GUAIFENESIN 1200 MG: 600 TABLET, EXTENDED RELEASE ORAL at 17:37

## 2017-01-01 RX ADMIN — SODIUM CHLORIDE 75 ML/HR: 9 INJECTION, SOLUTION INTRAVENOUS at 08:37

## 2017-01-01 RX ADMIN — ENOXAPARIN SODIUM 30 MG: 30 INJECTION SUBCUTANEOUS at 10:04

## 2017-01-01 RX ADMIN — BUDESONIDE AND FORMOTEROL FUMARATE DIHYDRATE 2 PUFF: 80; 4.5 AEROSOL RESPIRATORY (INHALATION) at 07:46

## 2017-01-01 RX ADMIN — IPRATROPIUM BROMIDE AND ALBUTEROL SULFATE 3 ML: 2.5; .5 SOLUTION RESPIRATORY (INHALATION) at 14:15

## 2017-01-01 RX ADMIN — IPRATROPIUM BROMIDE AND ALBUTEROL SULFATE 3 ML: 2.5; .5 SOLUTION RESPIRATORY (INHALATION) at 00:07

## 2017-01-01 RX ADMIN — FAMOTIDINE 40 MG: 20 TABLET ORAL at 08:25

## 2017-01-01 RX ADMIN — BUDESONIDE AND FORMOTEROL FUMARATE DIHYDRATE 2 PUFF: 80; 4.5 AEROSOL RESPIRATORY (INHALATION) at 19:51

## 2017-01-01 RX ADMIN — FAMOTIDINE 40 MG: 20 TABLET ORAL at 08:45

## 2017-01-01 RX ADMIN — IPRATROPIUM BROMIDE AND ALBUTEROL SULFATE 3 ML: 2.5; .5 SOLUTION RESPIRATORY (INHALATION) at 07:35

## 2017-01-01 RX ADMIN — BUSPIRONE HYDROCHLORIDE 5 MG: 5 TABLET ORAL at 20:01

## 2017-01-01 RX ADMIN — BUDESONIDE AND FORMOTEROL FUMARATE DIHYDRATE 2 PUFF: 80; 4.5 AEROSOL RESPIRATORY (INHALATION) at 20:57

## 2017-01-01 RX ADMIN — ONDANSETRON 4 MG: 2 INJECTION, SOLUTION INTRAMUSCULAR; INTRAVENOUS at 10:15

## 2017-01-01 RX ADMIN — GUAIFENESIN 1200 MG: 600 TABLET, EXTENDED RELEASE ORAL at 17:16

## 2017-01-01 RX ADMIN — METHYLPREDNISOLONE SODIUM SUCCINATE 40 MG: 125 INJECTION, POWDER, FOR SOLUTION INTRAMUSCULAR; INTRAVENOUS at 10:19

## 2017-01-01 RX ADMIN — BUSPIRONE HYDROCHLORIDE 5 MG: 5 TABLET ORAL at 22:03

## 2017-01-01 RX ADMIN — FUROSEMIDE 20 MG: 20 TABLET ORAL at 10:06

## 2017-01-01 RX ADMIN — METHYLPREDNISOLONE SODIUM SUCCINATE 60 MG: 125 INJECTION, POWDER, FOR SOLUTION INTRAMUSCULAR; INTRAVENOUS at 15:05

## 2017-01-01 RX ADMIN — IPRATROPIUM BROMIDE AND ALBUTEROL SULFATE 3 ML: 2.5; .5 SOLUTION RESPIRATORY (INHALATION) at 06:17

## 2017-01-01 RX ADMIN — ENOXAPARIN SODIUM 40 MG: 40 INJECTION SUBCUTANEOUS at 08:47

## 2017-01-01 RX ADMIN — PANTOPRAZOLE SODIUM 40 MG: 40 TABLET, DELAYED RELEASE ORAL at 06:53

## 2017-01-01 RX ADMIN — FUROSEMIDE 20 MG: 20 TABLET ORAL at 20:58

## 2017-01-01 RX ADMIN — FUROSEMIDE 20 MG: 20 TABLET ORAL at 09:39

## 2017-01-01 RX ADMIN — IPRATROPIUM BROMIDE AND ALBUTEROL SULFATE 3 ML: 2.5; .5 SOLUTION RESPIRATORY (INHALATION) at 11:17

## 2017-01-01 RX ADMIN — IPRATROPIUM BROMIDE AND ALBUTEROL SULFATE 3 ML: 2.5; .5 SOLUTION RESPIRATORY (INHALATION) at 08:10

## 2017-01-01 RX ADMIN — BUSPIRONE HYDROCHLORIDE 5 MG: 5 TABLET ORAL at 08:32

## 2017-01-01 RX ADMIN — METHYLPREDNISOLONE SODIUM SUCCINATE 60 MG: 125 INJECTION, POWDER, FOR SOLUTION INTRAMUSCULAR; INTRAVENOUS at 09:24

## 2017-01-01 RX ADMIN — GUAIFENESIN 1200 MG: 600 TABLET, EXTENDED RELEASE ORAL at 08:16

## 2017-01-01 RX ADMIN — BUDESONIDE AND FORMOTEROL FUMARATE DIHYDRATE 2 PUFF: 80; 4.5 AEROSOL RESPIRATORY (INHALATION) at 19:36

## 2017-01-01 RX ADMIN — ENOXAPARIN SODIUM 40 MG: 40 INJECTION SUBCUTANEOUS at 09:00

## 2017-01-01 RX ADMIN — BUDESONIDE AND FORMOTEROL FUMARATE DIHYDRATE 2 PUFF: 80; 4.5 AEROSOL RESPIRATORY (INHALATION) at 19:54

## 2017-01-01 RX ADMIN — METOPROLOL TARTRATE 25 MG: 25 TABLET, FILM COATED ORAL at 09:08

## 2017-01-01 RX ADMIN — IPRATROPIUM BROMIDE AND ALBUTEROL SULFATE 3 ML: 2.5; .5 SOLUTION RESPIRATORY (INHALATION) at 15:39

## 2017-01-01 RX ADMIN — BUSPIRONE HYDROCHLORIDE 5 MG: 5 TABLET ORAL at 20:40

## 2017-01-01 RX ADMIN — BUSPIRONE HYDROCHLORIDE 5 MG: 5 TABLET ORAL at 09:09

## 2017-01-01 RX ADMIN — IPRATROPIUM BROMIDE AND ALBUTEROL SULFATE 3 ML: 2.5; .5 SOLUTION RESPIRATORY (INHALATION) at 07:52

## 2017-01-01 RX ADMIN — BUDESONIDE AND FORMOTEROL FUMARATE DIHYDRATE 2 PUFF: 80; 4.5 AEROSOL RESPIRATORY (INHALATION) at 07:40

## 2017-01-01 RX ADMIN — IPRATROPIUM BROMIDE AND ALBUTEROL SULFATE 3 ML: 2.5; .5 SOLUTION RESPIRATORY (INHALATION) at 07:33

## 2017-01-01 RX ADMIN — BUSPIRONE HYDROCHLORIDE 5 MG: 5 TABLET ORAL at 21:23

## 2017-01-01 RX ADMIN — METHYLPREDNISOLONE SODIUM SUCCINATE 40 MG: 125 INJECTION, POWDER, FOR SOLUTION INTRAMUSCULAR; INTRAVENOUS at 00:23

## 2017-01-01 RX ADMIN — FUROSEMIDE 20 MG: 20 TABLET ORAL at 17:24

## 2017-01-01 RX ADMIN — FUROSEMIDE 20 MG: 20 TABLET ORAL at 09:08

## 2017-01-01 RX ADMIN — BUDESONIDE AND FORMOTEROL FUMARATE DIHYDRATE 2 PUFF: 80; 4.5 AEROSOL RESPIRATORY (INHALATION) at 09:57

## 2017-01-01 RX ADMIN — METHYLPREDNISOLONE SODIUM SUCCINATE 40 MG: 40 INJECTION, POWDER, LYOPHILIZED, FOR SOLUTION INTRAMUSCULAR; INTRAVENOUS at 01:18

## 2017-01-01 RX ADMIN — GUAIFENESIN 1200 MG: 600 TABLET, EXTENDED RELEASE ORAL at 09:36

## 2017-01-01 RX ADMIN — IPRATROPIUM BROMIDE AND ALBUTEROL SULFATE 3 ML: 2.5; .5 SOLUTION RESPIRATORY (INHALATION) at 03:26

## 2017-01-01 RX ADMIN — BUSPIRONE HYDROCHLORIDE 5 MG: 5 TABLET ORAL at 20:58

## 2017-01-01 RX ADMIN — METOPROLOL TARTRATE 25 MG: 25 TABLET, FILM COATED ORAL at 22:04

## 2017-01-01 RX ADMIN — FUROSEMIDE 20 MG: 20 TABLET ORAL at 10:04

## 2017-01-01 RX ADMIN — METHYLPREDNISOLONE SODIUM SUCCINATE 60 MG: 125 INJECTION, POWDER, FOR SOLUTION INTRAMUSCULAR; INTRAVENOUS at 08:45

## 2017-01-01 RX ADMIN — SODIUM CHLORIDE 75 ML/HR: 9 INJECTION, SOLUTION INTRAVENOUS at 11:20

## 2017-01-01 RX ADMIN — METHYLPREDNISOLONE SODIUM SUCCINATE 60 MG: 125 INJECTION, POWDER, FOR SOLUTION INTRAMUSCULAR; INTRAVENOUS at 02:46

## 2017-01-01 RX ADMIN — IPRATROPIUM BROMIDE AND ALBUTEROL SULFATE 3 ML: 2.5; .5 SOLUTION RESPIRATORY (INHALATION) at 19:36

## 2017-01-01 RX ADMIN — BUSPIRONE HYDROCHLORIDE 5 MG: 5 TABLET ORAL at 08:43

## 2017-01-01 RX ADMIN — IPRATROPIUM BROMIDE AND ALBUTEROL SULFATE 3 ML: 2.5; .5 SOLUTION RESPIRATORY (INHALATION) at 23:53

## 2017-01-01 RX ADMIN — METOPROLOL TARTRATE 25 MG: 25 TABLET, FILM COATED ORAL at 08:45

## 2017-01-01 RX ADMIN — AMOXICILLIN AND CLAVULANATE POTASSIUM 500 MG: 500; 125 TABLET, FILM COATED ORAL at 11:56

## 2017-01-01 RX ADMIN — BUSPIRONE HYDROCHLORIDE 5 MG: 5 TABLET ORAL at 10:20

## 2017-01-01 RX ADMIN — IPRATROPIUM BROMIDE AND ALBUTEROL SULFATE 3 ML: 2.5; .5 SOLUTION RESPIRATORY (INHALATION) at 11:35

## 2017-01-01 RX ADMIN — IPRATROPIUM BROMIDE AND ALBUTEROL SULFATE 3 ML: 2.5; .5 SOLUTION RESPIRATORY (INHALATION) at 14:18

## 2017-01-01 RX ADMIN — SODIUM CHLORIDE 75 ML/HR: 9 INJECTION, SOLUTION INTRAVENOUS at 09:13

## 2017-01-01 RX ADMIN — BUSPIRONE HYDROCHLORIDE 5 MG: 5 TABLET ORAL at 22:41

## 2017-01-01 RX ADMIN — IPRATROPIUM BROMIDE AND ALBUTEROL SULFATE 3 ML: 2.5; .5 SOLUTION RESPIRATORY (INHALATION) at 07:29

## 2017-01-01 RX ADMIN — METHYLPREDNISOLONE SODIUM SUCCINATE 60 MG: 125 INJECTION, POWDER, FOR SOLUTION INTRAMUSCULAR; INTRAVENOUS at 08:36

## 2017-01-01 RX ADMIN — FUROSEMIDE 20 MG: 20 TABLET ORAL at 17:26

## 2017-01-01 RX ADMIN — GUAIFENESIN 1200 MG: 600 TABLET, EXTENDED RELEASE ORAL at 09:09

## 2017-01-01 RX ADMIN — BUDESONIDE AND FORMOTEROL FUMARATE DIHYDRATE 2 PUFF: 80; 4.5 AEROSOL RESPIRATORY (INHALATION) at 08:18

## 2017-01-01 RX ADMIN — ENOXAPARIN SODIUM 30 MG: 30 INJECTION SUBCUTANEOUS at 10:19

## 2017-01-01 RX ADMIN — METHYLPREDNISOLONE SODIUM SUCCINATE 60 MG: 125 INJECTION, POWDER, FOR SOLUTION INTRAMUSCULAR; INTRAVENOUS at 16:13

## 2017-01-01 RX ADMIN — ALBUTEROL SULFATE 2.5 MG: 2.5 SOLUTION RESPIRATORY (INHALATION) at 06:29

## 2017-01-01 RX ADMIN — IPRATROPIUM BROMIDE AND ALBUTEROL SULFATE 3 ML: 2.5; .5 SOLUTION RESPIRATORY (INHALATION) at 07:41

## 2017-01-01 RX ADMIN — IPRATROPIUM BROMIDE AND ALBUTEROL SULFATE 6 ML: 2.5; .5 SOLUTION RESPIRATORY (INHALATION) at 00:39

## 2017-01-01 RX ADMIN — BUSPIRONE HYDROCHLORIDE 5 MG: 5 TABLET ORAL at 21:53

## 2017-01-01 RX ADMIN — METHYLPREDNISOLONE SODIUM SUCCINATE 60 MG: 125 INJECTION, POWDER, FOR SOLUTION INTRAMUSCULAR; INTRAVENOUS at 20:01

## 2017-01-01 RX ADMIN — METHYLPREDNISOLONE SODIUM SUCCINATE 60 MG: 125 INJECTION, POWDER, FOR SOLUTION INTRAMUSCULAR; INTRAVENOUS at 16:16

## 2017-01-01 RX ADMIN — GUAIFENESIN 1200 MG: 600 TABLET, EXTENDED RELEASE ORAL at 08:32

## 2017-01-01 RX ADMIN — METHYLPREDNISOLONE SODIUM SUCCINATE 60 MG: 125 INJECTION, POWDER, FOR SOLUTION INTRAMUSCULAR; INTRAVENOUS at 01:09

## 2017-01-01 RX ADMIN — FAMOTIDINE 40 MG: 20 TABLET ORAL at 10:06

## 2017-01-01 RX ADMIN — FUROSEMIDE 20 MG: 20 TABLET ORAL at 17:16

## 2017-01-01 RX ADMIN — AMOXICILLIN AND CLAVULANATE POTASSIUM 500 MG: 500; 125 TABLET, FILM COATED ORAL at 10:48

## 2017-01-01 RX ADMIN — METHYLPREDNISOLONE SODIUM SUCCINATE 60 MG: 125 INJECTION, POWDER, FOR SOLUTION INTRAMUSCULAR; INTRAVENOUS at 01:56

## 2017-01-01 RX ADMIN — FUROSEMIDE 20 MG: 20 TABLET ORAL at 20:01

## 2017-01-01 RX ADMIN — IPRATROPIUM BROMIDE AND ALBUTEROL SULFATE 3 ML: 2.5; .5 SOLUTION RESPIRATORY (INHALATION) at 09:39

## 2017-01-01 RX ADMIN — FUROSEMIDE 20 MG: 20 TABLET ORAL at 17:36

## 2017-01-01 RX ADMIN — ENOXAPARIN SODIUM 40 MG: 40 INJECTION SUBCUTANEOUS at 06:34

## 2017-01-01 RX ADMIN — PANTOPRAZOLE SODIUM 40 MG: 40 TABLET, DELAYED RELEASE ORAL at 09:38

## 2017-01-01 RX ADMIN — FUROSEMIDE 20 MG: 20 TABLET ORAL at 08:48

## 2017-01-01 RX ADMIN — BUDESONIDE AND FORMOTEROL FUMARATE DIHYDRATE 2 PUFF: 80; 4.5 AEROSOL RESPIRATORY (INHALATION) at 19:21

## 2017-01-01 RX ADMIN — METHYLPREDNISOLONE SODIUM SUCCINATE 60 MG: 125 INJECTION, POWDER, FOR SOLUTION INTRAMUSCULAR; INTRAVENOUS at 08:54

## 2017-01-01 RX ADMIN — IPRATROPIUM BROMIDE AND ALBUTEROL SULFATE 3 ML: 2.5; .5 SOLUTION RESPIRATORY (INHALATION) at 10:22

## 2017-01-01 RX ADMIN — TAZOBACTAM SODIUM AND PIPERACILLIN SODIUM 3.38 G: 375; 3 INJECTION, SOLUTION INTRAVENOUS at 06:36

## 2017-01-01 RX ADMIN — BUSPIRONE HYDROCHLORIDE 5 MG: 5 TABLET ORAL at 21:07

## 2017-01-01 RX ADMIN — BUDESONIDE AND FORMOTEROL FUMARATE DIHYDRATE 2 PUFF: 80; 4.5 AEROSOL RESPIRATORY (INHALATION) at 09:40

## 2017-01-01 RX ADMIN — IPRATROPIUM BROMIDE AND ALBUTEROL SULFATE 3 ML: 2.5; .5 SOLUTION RESPIRATORY (INHALATION) at 10:21

## 2017-01-01 RX ADMIN — BUSPIRONE HYDROCHLORIDE 5 MG: 5 TABLET ORAL at 16:13

## 2017-01-01 RX ADMIN — BUSPIRONE HYDROCHLORIDE 5 MG: 5 TABLET ORAL at 17:22

## 2017-01-01 RX ADMIN — BUSPIRONE HYDROCHLORIDE 5 MG: 5 TABLET ORAL at 17:24

## 2017-01-01 RX ADMIN — IPRATROPIUM BROMIDE AND ALBUTEROL SULFATE 3 ML: 2.5; .5 SOLUTION RESPIRATORY (INHALATION) at 11:15

## 2017-01-01 RX ADMIN — BUSPIRONE HYDROCHLORIDE 5 MG: 5 TABLET ORAL at 17:36

## 2017-01-01 RX ADMIN — SODIUM CHLORIDE 75 ML/HR: 9 INJECTION, SOLUTION INTRAVENOUS at 23:56

## 2017-01-01 RX ADMIN — IPRATROPIUM BROMIDE AND ALBUTEROL SULFATE 3 ML: 2.5; .5 SOLUTION RESPIRATORY (INHALATION) at 11:32

## 2017-01-01 RX ADMIN — FAMOTIDINE 40 MG: 20 TABLET ORAL at 09:10

## 2017-01-01 RX ADMIN — FUROSEMIDE 20 MG: 20 TABLET ORAL at 09:10

## 2017-01-01 RX ADMIN — BUSPIRONE HYDROCHLORIDE 5 MG: 5 TABLET ORAL at 16:17

## 2017-01-01 RX ADMIN — METHYLPREDNISOLONE SODIUM SUCCINATE 40 MG: 125 INJECTION, POWDER, FOR SOLUTION INTRAMUSCULAR; INTRAVENOUS at 00:44

## 2017-01-01 RX ADMIN — SODIUM CHLORIDE 75 ML/HR: 9 INJECTION, SOLUTION INTRAVENOUS at 20:59

## 2017-01-01 RX ADMIN — AMOXICILLIN AND CLAVULANATE POTASSIUM 500 MG: 500; 125 TABLET, FILM COATED ORAL at 22:41

## 2017-01-01 RX ADMIN — BUDESONIDE AND FORMOTEROL FUMARATE DIHYDRATE 2 PUFF: 80; 4.5 AEROSOL RESPIRATORY (INHALATION) at 07:52

## 2017-01-01 RX ADMIN — BUSPIRONE HYDROCHLORIDE 5 MG: 5 TABLET ORAL at 23:21

## 2017-01-01 RX ADMIN — IPRATROPIUM BROMIDE AND ALBUTEROL SULFATE 3 ML: 2.5; .5 SOLUTION RESPIRATORY (INHALATION) at 03:05

## 2017-01-01 RX ADMIN — IPRATROPIUM BROMIDE AND ALBUTEROL SULFATE 3 ML: 2.5; .5 SOLUTION RESPIRATORY (INHALATION) at 16:17

## 2017-01-01 RX ADMIN — GUAIFENESIN 1200 MG: 600 TABLET, EXTENDED RELEASE ORAL at 22:04

## 2017-01-01 RX ADMIN — ENOXAPARIN SODIUM 40 MG: 40 INJECTION SUBCUTANEOUS at 08:32

## 2017-01-01 RX ADMIN — IPRATROPIUM BROMIDE AND ALBUTEROL SULFATE 3 ML: 2.5; .5 SOLUTION RESPIRATORY (INHALATION) at 19:44

## 2017-01-01 RX ADMIN — FAMOTIDINE 40 MG: 20 TABLET ORAL at 09:08

## 2017-01-01 RX ADMIN — METHYLPREDNISOLONE SODIUM SUCCINATE 60 MG: 125 INJECTION, POWDER, FOR SOLUTION INTRAMUSCULAR; INTRAVENOUS at 20:28

## 2017-01-01 RX ADMIN — BUSPIRONE HYDROCHLORIDE 5 MG: 5 TABLET ORAL at 08:35

## 2017-01-01 RX ADMIN — METHYLPREDNISOLONE SODIUM SUCCINATE 60 MG: 125 INJECTION, POWDER, FOR SOLUTION INTRAMUSCULAR; INTRAVENOUS at 09:01

## 2017-01-01 RX ADMIN — SODIUM CHLORIDE 75 ML/HR: 9 INJECTION, SOLUTION INTRAVENOUS at 15:05

## 2017-01-01 RX ADMIN — PANTOPRAZOLE SODIUM 40 MG: 40 TABLET, DELAYED RELEASE ORAL at 06:59

## 2017-01-01 RX ADMIN — GUAIFENESIN 1200 MG: 600 TABLET, EXTENDED RELEASE ORAL at 08:35

## 2017-01-01 RX ADMIN — GUAIFENESIN 1200 MG: 600 TABLET, EXTENDED RELEASE ORAL at 21:28

## 2017-01-01 RX ADMIN — METHYLPREDNISOLONE SODIUM SUCCINATE 60 MG: 125 INJECTION, POWDER, FOR SOLUTION INTRAMUSCULAR; INTRAVENOUS at 19:51

## 2017-01-01 RX ADMIN — IPRATROPIUM BROMIDE AND ALBUTEROL SULFATE 3 ML: 2.5; .5 SOLUTION RESPIRATORY (INHALATION) at 20:03

## 2017-01-01 RX ADMIN — IPRATROPIUM BROMIDE AND ALBUTEROL SULFATE 3 ML: 2.5; .5 SOLUTION RESPIRATORY (INHALATION) at 14:59

## 2017-01-01 RX ADMIN — BUSPIRONE HYDROCHLORIDE 5 MG: 5 TABLET ORAL at 16:28

## 2017-01-01 RX ADMIN — IPRATROPIUM BROMIDE AND ALBUTEROL SULFATE 3 ML: 2.5; .5 SOLUTION RESPIRATORY (INHALATION) at 19:07

## 2017-01-01 RX ADMIN — METHYLPREDNISOLONE SODIUM SUCCINATE 60 MG: 125 INJECTION, POWDER, FOR SOLUTION INTRAMUSCULAR; INTRAVENOUS at 21:54

## 2017-01-01 RX ADMIN — BUSPIRONE HYDROCHLORIDE 5 MG: 5 TABLET ORAL at 16:08

## 2017-01-01 RX ADMIN — GUAIFENESIN 1200 MG: 600 TABLET, EXTENDED RELEASE ORAL at 09:39

## 2017-01-01 RX ADMIN — IPRATROPIUM BROMIDE AND ALBUTEROL SULFATE 3 ML: 2.5; .5 SOLUTION RESPIRATORY (INHALATION) at 11:04

## 2017-01-01 RX ADMIN — METHYLPREDNISOLONE SODIUM SUCCINATE 40 MG: 40 INJECTION, POWDER, LYOPHILIZED, FOR SOLUTION INTRAMUSCULAR; INTRAVENOUS at 13:23

## 2017-01-01 RX ADMIN — GUAIFENESIN 1200 MG: 600 TABLET, EXTENDED RELEASE ORAL at 20:27

## 2017-01-01 RX ADMIN — METOPROLOL TARTRATE 25 MG: 25 TABLET, FILM COATED ORAL at 08:25

## 2017-01-01 RX ADMIN — METHYLPREDNISOLONE SODIUM SUCCINATE 125 MG: 125 INJECTION, POWDER, FOR SOLUTION INTRAMUSCULAR; INTRAVENOUS at 02:07

## 2017-01-01 RX ADMIN — PANTOPRAZOLE SODIUM 40 MG: 40 TABLET, DELAYED RELEASE ORAL at 09:09

## 2017-01-01 RX ADMIN — BUSPIRONE HYDROCHLORIDE 5 MG: 5 TABLET ORAL at 08:44

## 2017-01-01 RX ADMIN — BUDESONIDE AND FORMOTEROL FUMARATE DIHYDRATE 2 PUFF: 80; 4.5 AEROSOL RESPIRATORY (INHALATION) at 20:56

## 2017-01-01 RX ADMIN — BUSPIRONE HYDROCHLORIDE 5 MG: 5 TABLET ORAL at 09:08

## 2017-01-01 RX ADMIN — METHYLPREDNISOLONE SODIUM SUCCINATE 60 MG: 125 INJECTION, POWDER, FOR SOLUTION INTRAMUSCULAR; INTRAVENOUS at 20:25

## 2017-01-01 RX ADMIN — GUAIFENESIN 1200 MG: 600 TABLET, EXTENDED RELEASE ORAL at 20:01

## 2017-01-01 RX ADMIN — AMOXICILLIN AND CLAVULANATE POTASSIUM 500 MG: 500; 125 TABLET, FILM COATED ORAL at 10:04

## 2017-01-01 RX ADMIN — GUAIFENESIN 1200 MG: 600 TABLET, EXTENDED RELEASE ORAL at 08:54

## 2017-01-01 RX ADMIN — BUDESONIDE AND FORMOTEROL FUMARATE DIHYDRATE 2 PUFF: 80; 4.5 AEROSOL RESPIRATORY (INHALATION) at 20:25

## 2017-01-01 RX ADMIN — IPRATROPIUM BROMIDE AND ALBUTEROL SULFATE 3 ML: 2.5; .5 SOLUTION RESPIRATORY (INHALATION) at 20:56

## 2017-01-01 RX ADMIN — PANTOPRAZOLE SODIUM 40 MG: 40 TABLET, DELAYED RELEASE ORAL at 05:44

## 2017-01-01 RX ADMIN — BUSPIRONE HYDROCHLORIDE 5 MG: 5 TABLET ORAL at 09:39

## 2017-01-01 RX ADMIN — BUSPIRONE HYDROCHLORIDE 5 MG: 5 TABLET ORAL at 23:56

## 2017-01-01 RX ADMIN — BUSPIRONE HYDROCHLORIDE 5 MG: 5 TABLET ORAL at 15:43

## 2017-01-01 RX ADMIN — IPRATROPIUM BROMIDE AND ALBUTEROL SULFATE 3 ML: 2.5; .5 SOLUTION RESPIRATORY (INHALATION) at 07:58

## 2017-01-01 RX ADMIN — TAZOBACTAM SODIUM AND PIPERACILLIN SODIUM 3.38 G: 375; 3 INJECTION, SOLUTION INTRAVENOUS at 11:52

## 2017-01-01 RX ADMIN — METHYLPREDNISOLONE SODIUM SUCCINATE 60 MG: 125 INJECTION, POWDER, FOR SOLUTION INTRAMUSCULAR; INTRAVENOUS at 08:44

## 2017-01-01 RX ADMIN — SODIUM CHLORIDE 75 ML/HR: 9 INJECTION, SOLUTION INTRAVENOUS at 03:13

## 2017-01-01 RX ADMIN — METHYLPREDNISOLONE SODIUM SUCCINATE 60 MG: 125 INJECTION, POWDER, FOR SOLUTION INTRAMUSCULAR; INTRAVENOUS at 17:30

## 2017-01-01 RX ADMIN — METHYLPREDNISOLONE SODIUM SUCCINATE 60 MG: 125 INJECTION, POWDER, FOR SOLUTION INTRAMUSCULAR; INTRAVENOUS at 21:27

## 2017-01-01 RX ADMIN — METHYLPREDNISOLONE SODIUM SUCCINATE 40 MG: 40 INJECTION, POWDER, LYOPHILIZED, FOR SOLUTION INTRAMUSCULAR; INTRAVENOUS at 01:00

## 2017-01-01 RX ADMIN — METOPROLOL TARTRATE 25 MG: 25 TABLET, FILM COATED ORAL at 08:47

## 2017-01-01 RX ADMIN — GUAIFENESIN 1200 MG: 600 TABLET, EXTENDED RELEASE ORAL at 17:26

## 2017-01-01 RX ADMIN — AMOXICILLIN AND CLAVULANATE POTASSIUM 500 MG: 500; 125 TABLET, FILM COATED ORAL at 21:23

## 2017-01-01 RX ADMIN — BUDESONIDE AND FORMOTEROL FUMARATE DIHYDRATE 2 PUFF: 80; 4.5 AEROSOL RESPIRATORY (INHALATION) at 20:03

## 2017-01-01 RX ADMIN — BUSPIRONE HYDROCHLORIDE 5 MG: 5 TABLET ORAL at 17:26

## 2017-01-01 RX ADMIN — IPRATROPIUM BROMIDE AND ALBUTEROL SULFATE 3 ML: 2.5; .5 SOLUTION RESPIRATORY (INHALATION) at 15:54

## 2017-01-01 RX ADMIN — METHYLPREDNISOLONE SODIUM SUCCINATE 20 MG: 40 INJECTION, POWDER, FOR SOLUTION INTRAMUSCULAR; INTRAVENOUS at 01:47

## 2017-01-01 RX ADMIN — FUROSEMIDE 20 MG: 20 TABLET ORAL at 20:27

## 2017-01-01 RX ADMIN — GUAIFENESIN 1200 MG: 600 TABLET, EXTENDED RELEASE ORAL at 20:58

## 2017-01-01 RX ADMIN — METHYLPREDNISOLONE SODIUM SUCCINATE 60 MG: 125 INJECTION, POWDER, FOR SOLUTION INTRAMUSCULAR; INTRAVENOUS at 02:48

## 2017-01-01 RX ADMIN — METHYLPREDNISOLONE SODIUM SUCCINATE 60 MG: 125 INJECTION, POWDER, FOR SOLUTION INTRAMUSCULAR; INTRAVENOUS at 03:31

## 2017-01-01 RX ADMIN — BUSPIRONE HYDROCHLORIDE 5 MG: 5 TABLET ORAL at 08:54

## 2017-01-01 RX ADMIN — METOPROLOL TARTRATE 25 MG: 25 TABLET, FILM COATED ORAL at 20:24

## 2017-01-01 RX ADMIN — BUDESONIDE AND FORMOTEROL FUMARATE DIHYDRATE 2 PUFF: 80; 4.5 AEROSOL RESPIRATORY (INHALATION) at 06:30

## 2017-01-01 RX ADMIN — GUAIFENESIN 1200 MG: 600 TABLET, EXTENDED RELEASE ORAL at 10:20

## 2017-01-01 RX ADMIN — IPRATROPIUM BROMIDE AND ALBUTEROL SULFATE 3 ML: 2.5; .5 SOLUTION RESPIRATORY (INHALATION) at 11:30

## 2017-01-01 RX ADMIN — IPRATROPIUM BROMIDE AND ALBUTEROL SULFATE 3 ML: 2.5; .5 SOLUTION RESPIRATORY (INHALATION) at 19:51

## 2017-01-01 RX ADMIN — FUROSEMIDE 20 MG: 20 TABLET ORAL at 22:04

## 2017-01-01 RX ADMIN — FUROSEMIDE 20 MG: 20 TABLET ORAL at 08:45

## 2017-01-01 RX ADMIN — AMOXICILLIN AND CLAVULANATE POTASSIUM 500 MG: 500; 125 TABLET, FILM COATED ORAL at 10:20

## 2017-01-01 RX ADMIN — BUSPIRONE HYDROCHLORIDE 5 MG: 5 TABLET ORAL at 09:36

## 2017-01-01 RX ADMIN — IPRATROPIUM BROMIDE AND ALBUTEROL SULFATE 3 ML: 2.5; .5 SOLUTION RESPIRATORY (INHALATION) at 07:40

## 2017-01-01 RX ADMIN — BUSPIRONE HYDROCHLORIDE 5 MG: 5 TABLET ORAL at 18:11

## 2017-01-01 RX ADMIN — TAZOBACTAM SODIUM AND PIPERACILLIN SODIUM 3.38 G: 375; 3 INJECTION, SOLUTION INTRAVENOUS at 05:22

## 2017-01-01 RX ADMIN — BUSPIRONE HYDROCHLORIDE 5 MG: 5 TABLET ORAL at 20:28

## 2017-01-01 RX ADMIN — IPRATROPIUM BROMIDE AND ALBUTEROL SULFATE 3 ML: 2.5; .5 SOLUTION RESPIRATORY (INHALATION) at 15:28

## 2017-01-01 RX ADMIN — METHYLPREDNISOLONE SODIUM SUCCINATE 60 MG: 125 INJECTION, POWDER, FOR SOLUTION INTRAMUSCULAR; INTRAVENOUS at 20:59

## 2017-01-01 RX ADMIN — BUDESONIDE AND FORMOTEROL FUMARATE DIHYDRATE 2 PUFF: 80; 4.5 AEROSOL RESPIRATORY (INHALATION) at 06:17

## 2017-01-01 RX ADMIN — BUDESONIDE AND FORMOTEROL FUMARATE DIHYDRATE 2 PUFF: 80; 4.5 AEROSOL RESPIRATORY (INHALATION) at 08:07

## 2017-01-01 RX ADMIN — PANTOPRAZOLE SODIUM 40 MG: 40 TABLET, DELAYED RELEASE ORAL at 05:53

## 2017-01-01 RX ADMIN — SODIUM CHLORIDE 75 ML/HR: 9 INJECTION, SOLUTION INTRAVENOUS at 20:32

## 2017-01-01 RX ADMIN — METHYLPREDNISOLONE SODIUM SUCCINATE 60 MG: 125 INJECTION, POWDER, FOR SOLUTION INTRAMUSCULAR; INTRAVENOUS at 12:57

## 2017-01-01 RX ADMIN — BUSPIRONE HYDROCHLORIDE 5 MG: 5 TABLET ORAL at 15:06

## 2017-01-01 RX ADMIN — METOPROLOL TARTRATE 25 MG: 25 TABLET, FILM COATED ORAL at 00:25

## 2017-01-01 RX ADMIN — SODIUM CHLORIDE 75 ML/HR: 9 INJECTION, SOLUTION INTRAVENOUS at 05:44

## 2017-01-01 RX ADMIN — FUROSEMIDE 20 MG: 20 TABLET ORAL at 10:19

## 2017-01-01 RX ADMIN — PANTOPRAZOLE SODIUM 40 MG: 40 TABLET, DELAYED RELEASE ORAL at 09:12

## 2017-01-01 RX ADMIN — GUAIFENESIN 1200 MG: 600 TABLET, EXTENDED RELEASE ORAL at 17:24

## 2017-01-01 RX ADMIN — IPRATROPIUM BROMIDE AND ALBUTEROL SULFATE 3 ML: 2.5; .5 SOLUTION RESPIRATORY (INHALATION) at 20:23

## 2017-01-01 RX ADMIN — FUROSEMIDE 20 MG: 20 TABLET ORAL at 09:12

## 2017-01-01 RX ADMIN — BUDESONIDE AND FORMOTEROL FUMARATE DIHYDRATE 2 PUFF: 80; 4.5 AEROSOL RESPIRATORY (INHALATION) at 07:31

## 2017-01-01 RX ADMIN — GUAIFENESIN 1200 MG: 600 TABLET, EXTENDED RELEASE ORAL at 10:04

## 2017-01-01 RX ADMIN — PANTOPRAZOLE SODIUM 40 MG: 40 TABLET, DELAYED RELEASE ORAL at 06:41

## 2017-01-01 RX ADMIN — PANTOPRAZOLE SODIUM 40 MG: 40 TABLET, DELAYED RELEASE ORAL at 06:02

## 2017-01-01 RX ADMIN — ALBUTEROL SULFATE 10 MG: 2.5 SOLUTION RESPIRATORY (INHALATION) at 04:40

## 2017-01-01 RX ADMIN — ENOXAPARIN SODIUM 40 MG: 40 INJECTION SUBCUTANEOUS at 06:59

## 2017-01-01 RX ADMIN — ALBUTEROL SULFATE 2.5 MG: 2.5 SOLUTION RESPIRATORY (INHALATION) at 20:24

## 2017-01-01 RX ADMIN — BUDESONIDE AND FORMOTEROL FUMARATE DIHYDRATE 2 PUFF: 80; 4.5 AEROSOL RESPIRATORY (INHALATION) at 07:33

## 2017-01-01 RX ADMIN — AMOXICILLIN AND CLAVULANATE POTASSIUM 500 MG: 500; 125 TABLET, FILM COATED ORAL at 20:28

## 2017-01-01 RX ADMIN — PANTOPRAZOLE SODIUM 40 MG: 40 TABLET, DELAYED RELEASE ORAL at 06:20

## 2017-01-01 RX ADMIN — FUROSEMIDE 20 MG: 20 TABLET ORAL at 08:35

## 2017-01-01 RX ADMIN — BUDESONIDE AND FORMOTEROL FUMARATE DIHYDRATE 2 PUFF: 80; 4.5 AEROSOL RESPIRATORY (INHALATION) at 19:01

## 2017-01-01 RX ADMIN — PANTOPRAZOLE SODIUM 40 MG: 40 TABLET, DELAYED RELEASE ORAL at 06:34

## 2017-01-01 RX ADMIN — ENOXAPARIN SODIUM 30 MG: 30 INJECTION SUBCUTANEOUS at 09:15

## 2017-01-01 RX ADMIN — BUDESONIDE AND FORMOTEROL FUMARATE DIHYDRATE 2 PUFF: 80; 4.5 AEROSOL RESPIRATORY (INHALATION) at 07:35

## 2017-01-01 RX ADMIN — ENOXAPARIN SODIUM 40 MG: 40 INJECTION SUBCUTANEOUS at 05:53

## 2017-01-01 RX ADMIN — BUDESONIDE AND FORMOTEROL FUMARATE DIHYDRATE 2 PUFF: 80; 4.5 AEROSOL RESPIRATORY (INHALATION) at 19:07

## 2017-01-01 RX ADMIN — GUAIFENESIN 1200 MG: 600 TABLET, EXTENDED RELEASE ORAL at 18:09

## 2017-01-01 RX ADMIN — METHYLPREDNISOLONE SODIUM SUCCINATE 60 MG: 125 INJECTION, POWDER, FOR SOLUTION INTRAMUSCULAR; INTRAVENOUS at 09:08

## 2017-01-01 RX ADMIN — SODIUM CHLORIDE 75 ML/HR: 9 INJECTION, SOLUTION INTRAVENOUS at 19:51

## 2017-01-01 RX ADMIN — ENOXAPARIN SODIUM 30 MG: 30 INJECTION SUBCUTANEOUS at 09:36

## 2017-01-01 RX ADMIN — AMOXICILLIN AND CLAVULANATE POTASSIUM 500 MG: 500; 125 TABLET, FILM COATED ORAL at 20:41

## 2017-01-01 RX ADMIN — BUDESONIDE AND FORMOTEROL FUMARATE DIHYDRATE 2 PUFF: 80; 4.5 AEROSOL RESPIRATORY (INHALATION) at 19:45

## 2017-01-01 RX ADMIN — ALBUTEROL SULFATE 2.5 MG: 2.5 SOLUTION RESPIRATORY (INHALATION) at 13:55

## 2017-01-01 RX ADMIN — BUSPIRONE HYDROCHLORIDE 5 MG: 5 TABLET ORAL at 20:27

## 2017-01-01 RX ADMIN — METHYLPREDNISOLONE SODIUM SUCCINATE 40 MG: 125 INJECTION, POWDER, FOR SOLUTION INTRAMUSCULAR; INTRAVENOUS at 16:08

## 2017-01-01 RX ADMIN — METHYLPREDNISOLONE SODIUM SUCCINATE 40 MG: 125 INJECTION, POWDER, FOR SOLUTION INTRAMUSCULAR; INTRAVENOUS at 16:28

## 2017-01-01 RX ADMIN — FUROSEMIDE 20 MG: 20 TABLET ORAL at 09:36

## 2017-01-01 RX ADMIN — IPRATROPIUM BROMIDE AND ALBUTEROL SULFATE 3 ML: 2.5; .5 SOLUTION RESPIRATORY (INHALATION) at 12:23

## 2017-01-01 RX ADMIN — ENOXAPARIN SODIUM 40 MG: 40 INJECTION SUBCUTANEOUS at 08:18

## 2017-01-01 RX ADMIN — BUSPIRONE HYDROCHLORIDE 5 MG: 5 TABLET ORAL at 08:48

## 2017-01-01 RX ADMIN — BUDESONIDE AND FORMOTEROL FUMARATE DIHYDRATE 2 PUFF: 80; 4.5 AEROSOL RESPIRATORY (INHALATION) at 09:51

## 2017-01-01 RX ADMIN — METHYLPREDNISOLONE SODIUM SUCCINATE 60 MG: 125 INJECTION, POWDER, FOR SOLUTION INTRAMUSCULAR; INTRAVENOUS at 08:31

## 2017-01-01 RX ADMIN — FAMOTIDINE 40 MG: 20 TABLET ORAL at 09:38

## 2017-01-01 RX ADMIN — PANTOPRAZOLE SODIUM 40 MG: 40 TABLET, DELAYED RELEASE ORAL at 06:40

## 2017-01-01 RX ADMIN — AMOXICILLIN AND CLAVULANATE POTASSIUM 500 MG: 500; 125 TABLET, FILM COATED ORAL at 21:07

## 2017-01-01 RX ADMIN — GUAIFENESIN 1200 MG: 600 TABLET, EXTENDED RELEASE ORAL at 08:45

## 2017-01-01 RX ADMIN — IPRATROPIUM BROMIDE AND ALBUTEROL SULFATE 3 ML: 2.5; .5 SOLUTION RESPIRATORY (INHALATION) at 15:26

## 2017-01-01 RX ADMIN — BUSPIRONE HYDROCHLORIDE 5 MG: 5 TABLET ORAL at 08:16

## 2017-01-01 RX ADMIN — IPRATROPIUM BROMIDE AND ALBUTEROL SULFATE 3 ML: 2.5; .5 SOLUTION RESPIRATORY (INHALATION) at 19:01

## 2017-01-01 RX ADMIN — FUROSEMIDE 20 MG: 20 TABLET ORAL at 08:16

## 2017-12-16 PROBLEM — R77.8 ELEVATED TROPONIN: Status: ACTIVE | Noted: 2017-01-01

## 2017-12-16 PROBLEM — J96.22 ACUTE ON CHRONIC RESPIRATORY FAILURE WITH HYPOXIA AND HYPERCAPNIA (HCC): Status: ACTIVE | Noted: 2017-01-01

## 2017-12-16 PROBLEM — J44.1 COPD EXACERBATION (HCC): Status: ACTIVE | Noted: 2017-01-01

## 2017-12-16 PROBLEM — J96.21 ACUTE ON CHRONIC RESPIRATORY FAILURE WITH HYPOXIA AND HYPERCAPNIA (HCC): Status: ACTIVE | Noted: 2017-01-01

## 2017-12-16 PROBLEM — J44.9 END STAGE COPD (HCC): Status: ACTIVE | Noted: 2017-01-01

## 2017-12-16 NOTE — PROGRESS NOTES
Discharge Planning Assessment  [facility]     Patient Name: Sophie Ortega  MRN: [id number]  Today's Date: 12/16/2017    Admit Date: 12/15/2017          Discharge Needs Assessment       12/16/17 1344    Living Environment    Lives With alone    Living Arrangements apartment    Home Accessibility no concerns    Stair Railings at Home none    Type of Financial/Environmental Concern none    Transportation Available car;family or friend will provide    Living Environment    Provides Primary Care For no one, unable/limited ability to care for self    Quality Of Family Relationships supportive    Able to Return to Prior Living Arrangements yes    Discharge Needs Assessment    Concerns To Be Addressed no discharge needs identified    Readmission Within The Last 30 Days no previous admission in last 30 days    Equipment Currently Used at Home oxygen    Equipment Needed After Discharge none    Discharge Facility/Level Of Care Needs home with home health            Discharge Plan     None        Discharge Placement     No information found                Demographic Summary     None            Functional Status     None            Psychosocial     None            Abuse/Neglect     None            Legal     None            Substance Abuse     None            Patient Forms     None          DEMI DelaneyW

## 2017-12-16 NOTE — PROGRESS NOTES
"Pharmacy Dosing Service  Anticoagulant  Enoxaparin    Assessment/Action/Plan:  Lovenox dose adjusted to 30mg sc q24 based on indication, borderline renal function, and BMI less than 20.     Subjective:  Sophie Ortega is a 77 y.o. female on Enoxaparin 40 mg SQ every 24 hours for indication of VTE prophylaxis.  Objective:  [Ht: 167.6 cm (66\"); Wt: 37.9 kg (83 lb 9.6 oz); BMI: Body mass index is 13.49 kg/(m^2).]  Estimated Creatinine Clearance: 34.1 mL/min (by C-G formula based on Cr of 0.67).   Lab Results   Component Value Date    INR 1.04 12/16/2017    INR 1.00 08/03/2016    PROTIME 13.9 12/16/2017    PROTIME 13.5 08/03/2016      Lab Results   Component Value Date    HGB 13.8 12/16/2017    HGB 13.6 08/03/2016      Lab Results   Component Value Date     12/16/2017     08/03/2016       JULIAN Gamboa, Pharm.D.    12/16/17 8:34 AM     "

## 2017-12-16 NOTE — PLAN OF CARE
Problem: Patient Care Overview (Adult)  Goal: Plan of Care Review  Outcome: Ongoing (interventions implemented as appropriate)    12/16/17 0908   Coping/Psychosocial Response Interventions   Plan Of Care Reviewed With patient   Patient Care Overview   Progress no change   Outcome Evaluation   Outcome Summary/Follow up Plan Initial nutrition assessment. Pt with increased nutrient needs r/t prolonged catabolic illness of COPD. She reports that her appetite comes and goes. She says that she does not eat well when she is sick. She is severely underweight with BMI of 13.49. She is 64% of IBW. She reports that she weighed 93# 2 weeks ago at MD appt. This is an 11% weight loss. Severe muscle and fat wasting noted per physical exam. Uses supplements at home. Will initiate Ensure Enlive TID. Pt likes all flavors. Obtained food preferences from pt and advised of alternate selections. Will cont to follow for nutrition needs.         Problem: Nutrition, Imbalanced: Inadequate Oral Intake (Adult)  Goal: Identify Related Risk Factors and Signs and Symptoms  Outcome: Outcome(s) achieved Date Met:  12/16/17  Goal: Improved Oral Intake  Outcome: Ongoing (interventions implemented as appropriate)  Goal: Prevent Further Weight Loss  Outcome: Ongoing (interventions implemented as appropriate)

## 2017-12-16 NOTE — CONSULTS
PULMONARY & CRITICAL CARE CONSULT - [facility]    17, 8:51 AM  Patient Care Team:  No Known Provider as PCP - General  No Known Provider as PCP - Family Medicine  Name: Sophie Ortega  : 1940  MRN: [id number]  Contact Serial Number [id number]    Chief complaint: Dyspnea    HPI:  We have been consulted by Cristian Butler DO to see this 77 y.o. year old female born on 1940.  Patient complains of dyspnea, cough and fatigue in the entirety of, chest for 2 days. Her O2 sats started dropping to the 70's. Severity: moderate and improving.  Aggravating factors: none.   Alleviating factors: medication(s) (albuterol and atrovent) Associated symptoms: fatigue and anxiety. Sputum is absent.  Patient currently is on oxygen at 5 L/min per nasal cannula.Respiratory history: bronchiectasis, COPD, emphysema and chronic respiratory failure . She is well known to our practice and just seen on an office visit on 17. She's been having a lot of issues with increased anxiety which worsens her dyspnea. She was recently started on a  low dose of Buspar.     She was placed on BiPAP in the ER and is now feeling better to she's transitioned to 5 liters with sats running in the low 90's, she typically runs 88-90% at rest. She has chronic volume loss on the current CXR and this is the area where bronchiectasis has been most evident on the last CTA of the chest. She's had elevated troponin but denies any chest pain. BNP is elevated as well.   Past Medical History:  Past Medical History:   Diagnosis Date   • Bronchiectasis    • Chronic respiratory failure    • COPD (chronic obstructive pulmonary disease)      Past Surgical History:   Procedure Laterality Date   • HYSTERECTOMY       Allergies   Allergen Reactions   • Ceftin [Cefuroxime Axetil]    • Codeine Sulfate    • Doxycycline    • Erythromycin    • Levaquin [Levofloxacin] Dizziness     Medications:    budesonide-formoterol 2 puff Inhalation BID -  RT   busPIRone 5 mg Oral TID   enoxaparin 30 mg Subcutaneous Q24H   guaiFENesin 1,200 mg Oral BID   ipratropium-albuterol 3 mL Nebulization Q6H - RT   methylPREDNISolone sodium succinate 60 mg Intravenous Q6H   pantoprazole 40 mg Oral QAM   piperacillin-tazobactam 3.375 g Intravenous Q8H        Family History:  History reviewed. No pertinent family history.     Social History:   reports that she has quit smoking. Her smoking use included Cigarettes. She has never used smokeless tobacco. She reports that she does not drink alcohol or use illicit drugs.  Review of Systems:    Review of Systems   Constitutional: Positive for fatigue. Negative for unexpected weight change.   HENT: Negative.    Eyes: Negative.    Respiratory: Positive for cough and shortness of breath.    Cardiovascular: Negative.  Negative for chest pain.   Gastrointestinal: Negative.    Genitourinary: Negative.    Musculoskeletal: Negative.    Skin: Negative.    Neurological: Positive for weakness.   Psychiatric/Behavioral: The patient is nervous/anxious.       Physical Exam:  Temp:  [97.9 °F (36.6 °C)-100.4 °F (38 °C)] 97.9 °F (36.6 °C)  Heart Rate:  [] 95  Resp:  [20-30] 24  BP: (107-145)/(70-98) 139/88No intake or output data in the 24 hours ending 12/16/17 0851  Last 3 weights    12/16/17  0322 12/16/17  0535   Weight: 41.3 kg (91 lb) 37.9 kg (83 lb 9.6 oz)     SpO2 Readings from Last 3 Encounters:   12/16/17 95%     Physical Exam   Constitutional: She is oriented to person, place, and time. She appears well-developed.   Thin and underweight, fragile appearing   HENT:   Head: Normocephalic and atraumatic.   Some skin blistering and erythema  to right cheek and bridge of nose   Eyes: Pupils are equal, round, and reactive to light. No scleral icterus.   Neck: Normal range of motion. Neck supple.   Cardiovascular: Normal rate and regular rhythm.    Pulmonary/Chest: Accessory muscle usage (this is typical for her) present. She has decreased  breath sounds (very diminished throughout). She has no wheezes. She has no rales.   Abdominal: Soft. Bowel sounds are normal. She exhibits no distension.   Musculoskeletal: Normal range of motion. She exhibits edema (2+ to ankles and feet).   Neurological: She is alert and oriented to person, place, and time.   Skin: Skin is warm and dry.   Psychiatric: She has a normal mood and affect.   Nursing note and vitals reviewed.         Results from last 7 days  Lab Units 12/16/17  0037   WBC 10*3/mm3 10.94*   HEMOGLOBIN g/dL 13.8   PLATELETS 10*3/mm3 148       Results from last 7 days  Lab Units 12/16/17  0558 12/16/17  0159   SODIUM mmol/L 138 139   POTASSIUM mmol/L 4.9 4.7   CO2 mmol/L 36.0* 35.0*   BUN mg/dL 18 17   CREATININE mg/dL 0.67 0.66   GLUCOSE mg/dL 145* 123*       Results from last 7 days  Lab Units 12/16/17  0629 12/16/17  0023   PH, ARTERIAL pH units 7.376 7.394   PCO2, ARTERIAL mm Hg 56.5* 54.0*   PO2 ART mm Hg 74.5* 72.5*   FIO2 % 50 70     Lab Results   Component Value Date    PROBNP 3880.0 (H) 12/16/2017        Recent radiology:   Imaging Results (last 72 hours)     Procedure Component Value Units Date/Time    XR Chest 1 View [[id number]] Collected:  12/16/17 0755     Updated:  12/16/17 0758    Narrative:       XR CHEST 1 VW- 12/16/2017 12:01 AM CST     HISTORY: SOB       COMPARISON: 09/23/2016.     FINDINGS:   There is moderate volume loss in the RIGHT lung. The patient is rotated  on today's exam. No acute interval change is seen in the lungs since the  previous study. The cardiomediastinal silhouette and pulmonary  vascularity are unchanged.      The osseous structures and surrounding soft tissues demonstrate no acute  abnormality.       Impression:       1. Emphysematous changes and chronic volume loss in the RIGHT lung.  2. No acute interval change since 09/23/2016.        This report was finalized on 12/16/2017 07:55 by Dr. Henrry Eckert MD.        My radiograph interpretation/independent review  of imaging: Chronic volume loss to right lung    Other test results (not lab or imaging): PFT's from office notes reviewed, she has severe, GOLD stage 4 disease    Independent review of ekg: sinus tach with PVC's  Patient Active Problem List   Diagnosis   • COPD exacerbation     Pulmonary Assessment:  New problem (to me), with additional workup planned: Acute on chronic hypercapnic, hypoxemic respiratory failure    New problem (to me), no additional workup planned: None     Other problems either stable, failing to improve or worsenin. COPD, severe  2. Anxiety  3. Emphysema  4. Bronchiectasis, on Zithromax every Monday, Wednesday and Friday  5. Elevated troponin, likely stress related  6. Elevated ProBNP    Recommend/plan:   · Sputum culture ordered   · Lasix 40 mg given today, repeat dose tomorrow  · 2D echo pending  · Chest CT pending but patient will likely not want to have it done. She reluctantly had a CTA chest in August and has many chronic findings.    · Aerobika flutter device ordered to be done with nebs QID  · Continue mucinex  · Continue IV solumedrol  · Continue Symbicort  · On Zosyn day #1  · O2 to keep sats 88-92%  · BiPAP as needed but she will need close attention to blistering on face that has occurred since using the mask and have some barrier put in place if she requires wearing it again.      Electronically signed by LAWRENCE Perkins, 17, 8:51 AM    Physician substantive contribution:  Pertinent symptoms/interval history include: She comes in with respiratory failure and bronchiectasis  Respiratory exam shows pertinent findings of:diminished breath sounds, crackles, accessory muscle use.  Plan includes: transition off bipap in daytime, continue as needed, continue RT, diurese.  I have seen and examined patient personally, performing a face-to-face diagnostic evaluation with plan of care reviewed and developed with APRN and nursing staff. I have addended and/or modified the  above history of present illness, physical examination, and assessment and plan to reflect my findings and impressions. Essential elements of the care plan were discussed with APRN above.  Agree with findings and assessment/plan as documented above.    Electronically signed by Anirudh Martin MD, on 12/16/2017, 12:12 PM

## 2017-12-16 NOTE — PROGRESS NOTES
Malnutrition Severity Assessment    Patient Name:  Spohie Ortega  YOB: 1940  MRN: [id number]  Admit Date:  12/15/2017    Patient meets criteria for : Severe malnutrition    Comments:  Please attest this note if you agree with this assessment for severe malnutrition.    Pt reports that her appetite comes and goes, but is poor when she does not feel good. She says that she went to the MD office 12/6 and weighed ~93#. She is currently 83# with a BMI of 13.49. This is an 11% weight loss in 2 weeks. She is 64% of IBW. Nurse notes indicate slow skin elasticity and ailyn score of 17. She has severe muscle and fat wasting per nutrition focused physical exam. See below for details.    She reports that she consumed Ensure at home and likes all flavors. Will initiate Ensure Enlive TID. Obtained food preferences and advised pt of alternate selections available. Pt had just finished eating breakfast and had consumed ~75% of her meal. Will cont to follow and encourage intake.    Malnutrition Type: Chronic Illness Malnutrition     Malnutrition Type (last 8 hours)      Malnutrition Severity Assessment       12/16/17 0855    Malnutrition Severity Assessment    Malnutrition Type Chronic Illness Malnutrition      12/16/17 0855    Physical Signs of Malnutrition (Chronic)    Muscle Wasting Severe   hollow depression to temples, clavicle and acromion very prominent, prominent scapula, depressed dorsal hand    Fat Loss Severe   hollow, depressed dark circles around eyes, sever fat wasting to upper arms    Fluid Accumulation Mild   per NN's-mild edema to feet and ankles    Secondary Physical Signs Present (comment)   slow skin elasticity      12/16/17 0855    Weight Status (Chronic)    BMI Severe (<16)   13.49    %IBW Severe (<70%)   64%    %UBW Mild (86-90%)   89%    Weight Loss Severe (>5% / 1 mo)   11% in 2 weeks      12/16/17 0855    Energy Intake Status (Chronic)    Energy Intake Mild (<75% / 5d)      12/16/17 0855     Criteria Met (Must meet criteria for severity in at least 2 of these categories: M Wasting, Fat Loss, Fluid, Secondary Signs, Wt. Status, Intake)    Patient meets criteria for  Severe malnutrition          Electronically signed by:  Milena Mckeon RDN, LD  12/16/17 9:03 AM

## 2017-12-16 NOTE — PLAN OF CARE
Problem: Infection, Risk/Actual (Adult)  Goal: Identify Related Risk Factors and Signs and Symptoms  Outcome: Ongoing (interventions implemented as appropriate)  Goal: Infection Prevention/Resolution  Outcome: Ongoing (interventions implemented as appropriate)    Problem: Respiratory Insufficiency (Adult)  Goal: Identify Related Risk Factors and Signs and Symptoms  Outcome: Ongoing (interventions implemented as appropriate)  Goal: Acid/Base Balance  Outcome: Ongoing (interventions implemented as appropriate)  Goal: Effective Ventilation  Outcome: Ongoing (interventions implemented as appropriate)    Problem: Fall Risk (Adult)  Goal: Identify Related Risk Factors and Signs and Symptoms  Outcome: Ongoing (interventions implemented as appropriate)  Goal: Absence of Falls  Outcome: Ongoing (interventions implemented as appropriate)    Problem: Patient Care Overview (Adult)  Goal: Plan of Care Review  Outcome: Ongoing (interventions implemented as appropriate)    12/16/17 2866   Coping/Psychosocial Response Interventions   Plan Of Care Reviewed With patient   Patient Care Overview   Progress improving   Outcome Evaluation   Outcome Summary/Follow up Plan Patient states that she feels much better than she did on admission; Patient reports SOA with exertion       Goal: Adult Individualization and Mutuality  Outcome: Ongoing (interventions implemented as appropriate)    Problem: Nutrition, Imbalanced: Inadequate Oral Intake (Adult)  Goal: Improved Oral Intake  Outcome: Ongoing (interventions implemented as appropriate)  Goal: Prevent Further Weight Loss  Outcome: Ongoing (interventions implemented as appropriate)

## 2017-12-16 NOTE — PLAN OF CARE
Problem: Infection, Risk/Actual (Adult)  Goal: Identify Related Risk Factors and Signs and Symptoms  Outcome: Ongoing (interventions implemented as appropriate)  Goal: Infection Prevention/Resolution  Outcome: Ongoing (interventions implemented as appropriate)    Problem: Respiratory Insufficiency (Adult)  Goal: Identify Related Risk Factors and Signs and Symptoms  Outcome: Ongoing (interventions implemented as appropriate)  Goal: Acid/Base Balance  Outcome: Ongoing (interventions implemented as appropriate)  Goal: Effective Ventilation  Outcome: Ongoing (interventions implemented as appropriate)    Problem: Fall Risk (Adult)  Goal: Identify Related Risk Factors and Signs and Symptoms  Outcome: Ongoing (interventions implemented as appropriate)  Goal: Absence of Falls  Outcome: Ongoing (interventions implemented as appropriate)    Problem: Patient Care Overview (Adult)  Goal: Plan of Care Review  Outcome: Ongoing (interventions implemented as appropriate)    12/16/17 0651   Coping/Psychosocial Response Interventions   Plan Of Care Reviewed With patient;son   Patient Care Overview   Progress no change   Outcome Evaluation   Outcome Summary/Follow up Plan Pt admitted at 0500 with COPD exacerbation, she does not like wearing the BiPAP and has asked to have it removed several times. She lives alone and her son helps care for her. She wears 4L NC at home for chronic resp condition. Has mild edema in bilat ankles/feet. Will continue to monitor.        Goal: Adult Individualization and Mutuality  Outcome: Ongoing (interventions implemented as appropriate)  Goal: Discharge Needs Assessment  Outcome: Ongoing (interventions implemented as appropriate)

## 2017-12-16 NOTE — H&P
[facility] Medicine Services  HISTORY AND PHYSICAL    Date of Admission: 12/15/2017  Primary Care Physician: No Known Provider    Subjective     Chief Complaint: Shortness of breath    History of Present Illness  Patient with chronic history of COPD on home O2 presents with increased SOB over last 24 48 hours.  Denies chest pain.  Presented to ER in distress.   Placed on BiPAP and is now resting comfortably in ER.  We have been asked to admit her for COPD exacerbation.  She quit smoking several years ago.        Review of Systems   14 point review of systems negative except for as per HPI    Otherwise complete ROS reviewed and negative except as mentioned in the HPI.    Past Medical History:   Past Medical History:   Diagnosis Date   • Bronchiectasis    • Chronic respiratory failure    • COPD (chronic obstructive pulmonary disease)      Past Surgical History:  Past Surgical History:   Procedure Laterality Date   • HYSTERECTOMY       Social History:  reports that she has quit smoking. Her smoking use included Cigarettes. She has never used smokeless tobacco. She reports that she does not drink alcohol or use illicit drugs.    Family History: No History of DM, or HTN    Allergies:  Allergies   Allergen Reactions   • Ceftin [Cefuroxime Axetil]    • Codeine Sulfate    • Doxycycline    • Erythromycin    • Levaquin [Levofloxacin] Dizziness     Medications:  Prior to Admission medications    Medication Sig Start Date End Date Taking? Authorizing Provider   albuterol (PROVENTIL HFA;VENTOLIN HFA) 108 (90 Base) MCG/ACT inhaler Inhale 2 puffs Every 4 (Four) Hours As Needed for Wheezing.   Yes Historical Provider, MD   albuterol (PROVENTIL) (2.5 MG/3ML) 0.083% nebulizer solution Take 2.5 mg by nebulization Every 4 (Four) Hours As Needed for Wheezing.   Yes Historical Provider, MD   azithromycin (ZITHROMAX) 250 MG tablet Take 250 mg by mouth Daily. Take 2 tablets the first day, then 1 tablet  "daily for 4 days.   Yes Historical Provider, MD   busPIRone (BUSPAR) 5 MG tablet Take 5 mg by mouth 3 (Three) Times a Day.   Yes Historical Provider, MD   fluticasone-salmeterol (ADVAIR) 100-50 MCG/DOSE DISKUS Inhale 2 (Two) Times a Day.   Yes Historical Provider, MD   guaiFENesin (MUCINEX) 600 MG 12 hr tablet Take 1,200 mg by mouth 2 (Two) Times a Day.   Yes Historical Provider, MD   O2 (OXYGEN) Inhale 1 (One) Time.   Yes Historical Provider, MD   omeprazole (priLOSEC) 20 MG capsule Take 20 mg by mouth Daily.   Yes Historical Provider, MD   Umeclidinium Bromide (INCRUSE ELLIPTA IN) Inhale.   Yes Historical Provider, MD     Objective     Vital Signs: /84  Pulse 97  Temp 99.6 °F (37.6 °C) (Temporal Artery )   Resp 20  Ht 167.6 cm (66\")  Wt 41.3 kg (91 lb)  SpO2 94%  BMI 14.69 kg/m2  Physical Exam  Constitutional: She is oriented to person, place, and time. She appears well-developed and well-nourished. No distress.   HENT:   Head: Normocephalic and atraumatic.   Mouth/Throat: Oropharynx is clear and moist. No oropharyngeal exudate.   Eyes: EOM are normal. Pupils are equal, round, and reactive to light.   Neck: Normal range of motion. Neck supple. No JVD present.   Cardiovascular: Regular rhythm, S1 normal, S2 normal and normal heart sounds.  Tachycardia present.  Exam reveals no gallop and no friction rub.    No murmur heard.  Pulmonary/Chest: She is in respiratory distress. She has decreased breath sounds in the right upper field, the right middle field, the right lower field, the left upper field, the left middle field and the left lower field. She has no wheezes. She has no rhonchi. She has no rales.   Abdominal: Soft. Bowel sounds are normal. She exhibits no distension. There is no tenderness. There is no rebound and no guarding.   Neurological: She is alert and oriented to person, place, and time. No cranial nerve deficit.   Skin: Skin is warm and dry. No rash noted.   Psychiatric: She has a normal " mood and affect. Her behavior is normal. Judgment and thought content normal.   Nursing note and vitals reviewed.      Results Reviewed:  Lab Results (last 24 hours)     Procedure Component Value Units Date/Time    Blood Gas, Arterial [[id number]]  (Abnormal) Collected:  12/16/17 0023    Specimen:  Arterial Blood Updated:  12/16/17 0031     Site Right Radial     Meet's Test Positive     pH, Arterial 7.394 pH units      pCO2, Arterial 54.0 (H) mm Hg      pO2, Arterial 72.5 (L) mm Hg      HCO3, Arterial 33.0 (H) mmol/L      Base Excess, Arterial 6.4 (H) mmol/L      O2 Saturation, Arterial 94.5 %      Temperature 37.0 C      Barometric Pressure for Blood Gas 755 mmHg      Modality BiPap     FIO2 70 %      Ventilator Mode NA     Set Mech Resp Rate 14.0     IPAP 12     EPAP 6     Collected by [id number]    CBC & Differential [[id number]] Collected:  12/16/17 0037    Specimen:  Blood Updated:  12/16/17 0054    Narrative:       The following orders were created for panel order CBC & Differential.  Procedure                               Abnormality         Status                     ---------                               -----------         ------                     CBC Auto Differential[[id number]]        Abnormal            Final result                 Please view results for these tests on the individual orders.    CBC Auto Differential [[id number]]  (Abnormal) Collected:  12/16/17 0037    Specimen:  Blood Updated:  12/16/17 0054     WBC 10.94 (H) 10*3/mm3      RBC 4.35 10*6/mm3      Hemoglobin 13.8 g/dL      Hematocrit 42.0 %      MCV 96.6 fL      MCH 31.7 pg      MCHC 32.9 (L) g/dL      RDW 14.5 %      RDW-SD 51.5 fl      MPV 10.7 fL      Platelets 148 10*3/mm3      Neutrophil % 89.6 (H) %      Lymphocyte % 2.7 (L) %      Monocyte % 6.7 %      Eosinophil % 0.2 %      Basophil % 0.4 %      Immature Grans % 0.4 %      Neutrophils, Absolute 9.81 (H) 10*3/mm3      Lymphocytes, Absolute 0.30 (L) 10*3/mm3      Monocytes, Absolute  0.73 10*3/mm3      Eosinophils, Absolute 0.02 10*3/mm3      Basophils, Absolute 0.04 10*3/mm3      Immature Grans, Absolute 0.04 (H) 10*3/mm3      nRBC 0.0 /100 WBC     Protime-INR [[id number]]  (Normal) Collected:  12/16/17 0038    Specimen:  Blood Updated:  12/16/17 0102     Protime 13.9 Seconds      INR 1.04    aPTT [[id number]]  (Normal) Collected:  12/16/17 0038    Specimen:  Blood Updated:  12/16/17 0102     PTT 29.5 seconds     Blood Culture With TIMOTHY - Blood, [[id number]] Collected:  12/16/17 0037    Specimen:  Blood from Arm, Right Updated:  12/16/17 0105    Lactic Acid, Plasma [[id number]]  (Normal) Collected:  12/16/17 0037    Specimen:  Blood Updated:  12/16/17 0109     Lactate 1.4 mmol/L     Blood Culture With TIMOTHY - Blood, [[id number]] Collected:  12/16/17 0159    Specimen:  Blood from Hand, Left Updated:  12/16/17 0209    Comprehensive Metabolic Panel [[id number]]  (Abnormal) Collected:  12/16/17 0159    Specimen:  Blood Updated:  12/16/17 0218     Glucose 123 (H) mg/dL      BUN 17 mg/dL      Creatinine 0.66 mg/dL      Sodium 139 mmol/L      Potassium 4.7 mmol/L      Chloride 96 (L) mmol/L      CO2 35.0 (H) mmol/L      Calcium 9.3 mg/dL      Total Protein 6.7 g/dL      Albumin 3.90 g/dL      ALT (SGPT) 41 U/L      AST (SGOT) 24 U/L      Alkaline Phosphatase 78 U/L      Total Bilirubin 0.9 mg/dL      eGFR Non African Amer 86 mL/min/1.73      Globulin 2.8 gm/dL      A/G Ratio 1.4 g/dL      BUN/Creatinine Ratio 25.8 (H)     Anion Gap 8.0 mmol/L     Narrative:       The MDRD GFR formula is only valid for adults with stable renal function between ages 18 and 70.    CK-MB [[id number]]  (Normal) Collected:  12/16/17 0159    Specimen:  Blood Updated:  12/16/17 0235     CKMB 1.27 ng/mL     Narrative:       CKMB Index not indicated    Troponin [[id number]]  (Abnormal) Collected:  12/16/17 0159    Specimen:  Blood Updated:  12/16/17 0235     Troponin I 0.048 (H) ng/mL     BNP [[id number]] Collected:  12/16/17 0159     Specimen:  Blood Updated:  12/16/17 0451        Imaging Results (last 24 hours)     Procedure Component Value Units Date/Time    XR Chest 1 View [[id number]] Resulted:  12/16/17 0258     Updated:  12/16/17 0300        I have personally reviewed and interpreted the radiology studies and ECG obtained at time of admission.     Assessment / Plan     Assessment:   Hospital Problem List     COPD exacerbation      1. COPD exacerbation  With acute on chronic hypoxic hypercarbic respiratory failure:  Plans to check CT chest to further evaluate lungs.  Cover with broad spectrum antibiotics nebulizer treatments and steroids.  Check cultures of blood and sputum. Will check BNP and give dose of lasix believe element of heart failure also present.  Continue supportive ventilation with BiPAP.  Consult Pulmonology due to severity of disease.  2.  Positive Troponin probably stress related will check serial enzymes and repeat EKG as the one in ER is of poor quality.  Consider Cardiology consult will check 2 D echo.    Code Status: Full     I discussed the patients findings and my recommendations with the patient and son who healthcare power surrogate.    Estimated length of stay 2-3 days    Neo Tee MD   12/16/17   5:06 AM

## 2017-12-17 NOTE — PROGRESS NOTES
[facility] Medicine Services  INPATIENT PROGRESS NOTE    Length of Stay: 1  Date of Admission: 12/15/2017  Primary Care Physician: No Known Provider    Subjective     Chief Complaint:     Shortness of breath    HPI     ABGs reveal a very mild, not fully compensated respiratory acidosis with chronic CO2 retention.  PO2 is 78.  The patient is not yet back to baseline.  She desaturates with conversation.  She is alert and cooperative.  Urine output as recorded is inaccurate as several urinations last night went unreported.    Review of Systems     All pertinent negatives and positives are as above. All other systems have been reviewed and are negative unless otherwise stated.     Objective    Temp:  [98.4 °F (36.9 °C)-99.9 °F (37.7 °C)] 98.9 °F (37.2 °C)  Heart Rate:  [] 108  Resp:  [16-20] 18  BP: ()/(39-96) 130/55    Lab Results (last 24 hours)     Procedure Component Value Units Date/Time    Blood Culture With TIMOTHY - Blood, [[id number]]  (Normal) Collected:  12/16/17 0037    Specimen:  Blood from Arm, Right Updated:  12/17/17 0116     Blood Culture No growth at 24 hours    Blood Culture With TIMOTHY - Blood, [[id number]]  (Normal) Collected:  12/16/17 0159    Specimen:  Blood from Hand, Left Updated:  12/17/17 0216     Blood Culture No growth at 24 hours    Blood Gas, Arterial [[id number]]  (Abnormal) Collected:  12/17/17 0415    Specimen:  Arterial Blood Updated:  12/17/17 0429     Site Right Radial     Meet's Test Positive     pH, Arterial 7.341 (L) pH units      pCO2, Arterial 71.1 (C) mm Hg      pO2, Arterial 77.9 (L) mm Hg      HCO3, Arterial 38.4 (H) mmol/L      Base Excess, Arterial 10.1 (H) mmol/L      O2 Saturation, Arterial 95.8 %      Temperature 37.0 C      Barometric Pressure for Blood Gas 754 mmHg      Modality Nasal Cannula     Flow Rate 4.5 lpm      Ventilator Mode NA     Notified Who ZIA Suarez [id number]     Notified By [id number]     Notified Time 12/17/2017  04:30     Collected by [id number]    BNP [[id number]]  (Abnormal) Collected:  12/17/17 0543    Specimen:  Blood Updated:  12/17/17 0618     proBNP 4670.0 (H) pg/mL     CBC Auto Differential [[id number]]  (Abnormal) Collected:  12/17/17 0543    Specimen:  Blood Updated:  12/17/17 0625     WBC 8.18 10*3/mm3      RBC 3.80 (L) 10*6/mm3      Hemoglobin 12.0 g/dL      Hematocrit 37.1 %      MCV 97.6 fL      MCH 31.6 pg      MCHC 32.3 (L) g/dL      RDW 14.5 %      RDW-SD 51.9 fl      MPV 11.0 fL      Platelets 121 (L) 10*3/mm3      Neutrophil % 94.5 (H) %      Lymphocyte % 2.2 (L) %      Monocyte % 2.6 (L) %      Eosinophil % 0.0 %      Basophil % 0.1 %      Immature Grans % 0.6 %      Neutrophils, Absolute 7.73 10*3/mm3      Lymphocytes, Absolute 0.18 (L) 10*3/mm3      Monocytes, Absolute 0.21 10*3/mm3      Eosinophils, Absolute 0.00 10*3/mm3      Basophils, Absolute 0.01 10*3/mm3      Immature Grans, Absolute 0.05 (H) 10*3/mm3      nRBC 0.0 /100 WBC     Comprehensive Metabolic Panel [[id number]]  (Abnormal) Collected:  12/17/17 0543    Specimen:  Blood Updated:  12/17/17 0635     Glucose 177 (H) mg/dL      BUN 28 (H) mg/dL      Creatinine 0.76 mg/dL      Sodium 139 mmol/L      Potassium 3.9 mmol/L      Chloride 93 (L) mmol/L      CO2 39.0 (H) mmol/L      Calcium 8.7 mg/dL      Total Protein 6.2 (L) g/dL      Albumin 3.50 g/dL      ALT (SGPT) 38 U/L      AST (SGOT) 24 U/L      Alkaline Phosphatase 55 U/L      Total Bilirubin 0.5 mg/dL      eGFR Non African Amer 73 mL/min/1.73      Globulin 2.7 gm/dL      A/G Ratio 1.3 g/dL      BUN/Creatinine Ratio 36.8 (H)     Anion Gap 7.0 mmol/L     Narrative:       The MDRD GFR formula is only valid for adults with stable renal function between ages 18 and 70.    Respiratory Culture - Sputum, Cough [[id number]] Collected:  12/16/17 1153    Specimen:  Sputum from Cough Updated:  12/17/17 1029     Respiratory Culture --      Light growth (2+) Normal Respiratory Sherry     Gram Stain Result  Greater than 25 WBCs per low power field      Few (2+) Mixed gram positive chato      No epithelial cells seen          Imaging Results (last 24 hours)     ** No results found for the last 24 hours. **             Intake/Output Summary (Last 24 hours) at 12/17/17 1032  Last data filed at 12/17/17 1029   Gross per 24 hour   Intake              960 ml   Output              550 ml   Net              410 ml       Physical Exam    Constitutional: She is oriented to person, place, and time. Vital signs are normal. She appears cachectic. She is cooperative. Nasal cannula in place.   HENT:   Head: Normocephalic and atraumatic.   Nose: Nose normal.   Mouth/Throat: Oropharynx is clear and moist.   Eyes: Conjunctivae are normal. No scleral icterus.   Neck: Normal range of motion. Neck supple. No JVD present. No tracheal deviation present. No thyromegaly present.   Cardiovascular: Normal rate, regular rhythm and normal heart sounds.    No murmur heard.  Pulmonary/Chest: Tachypnea noted. No further respiratory distress (chronic). She has decreased breath sounds (throughout). She has no wheezes.   Abdominal: Soft. Bowel sounds are normal. She exhibits no distension. There is no tenderness.   Musculoskeletal: Normal range of motion. She exhibits edema (1/4 bilat feet). She exhibits no tenderness.   Neurological: She is alert and oriented to person, place, and time. She has normal reflexes. No cranial nerve deficit. Coordination normal.   Skin: Skin is warm and dry.   Psychiatric: She has a normal mood and affect. Her behavior is normal. Judgment and thought content normal.     Results Review:  I have reviewed the labs, radiology results, and diagnostic studies since my last progress note and made treatment changes reflective of the results.   I have reviewed the current medications.    Assessment/Plan     Hospital Problem List     * (Principal)Acute on chronic respiratory failure with hypoxia and hypercapnia    COPD exacerbation     End stage COPD    Elevated troponin          PLAN:  Discontinue Zosyn in favor of Augmentin 500 mg by mouth twice a day  Steroid taper per pulmonology    Cristian Butler DO   12/17/17   10:32 AM

## 2017-12-17 NOTE — PLAN OF CARE
"Problem: Patient Care Overview (Adult)  Goal: Plan of Care Review  Outcome: Ongoing (interventions implemented as appropriate)    12/17/17 9876   Outcome Evaluation   Outcome Summary/Follow up Plan Patient continues to desat with any activity; patient is upbeat; patient ate 75% of breakfast and 25% of lunch so intake is improving; patient is looking forward to discharge but does not want to be discharged \"too soon\".       Goal: Adult Individualization and Mutuality  Outcome: Ongoing (interventions implemented as appropriate)    Problem: Nutrition, Imbalanced: Inadequate Oral Intake (Adult)  Goal: Improved Oral Intake  Outcome: Ongoing (interventions implemented as appropriate)  Goal: Prevent Further Weight Loss  Outcome: Ongoing (interventions implemented as appropriate)      "

## 2017-12-17 NOTE — PROGRESS NOTES
"[facility] Pulmonary Progress Note    Patient: Sophie Ortega  1940   MR# [id number]   [facility]# [id number]  12/17/17   11:21 AM  Referring Provider: Cristian Butler DO      Problem list:   Patient Active Problem List   Diagnosis   • COPD exacerbation   • Elevated troponin   • End stage COPD   • Acute on chronic respiratory failure with hypoxia and hypercapnia        Chief Complaint: Dyspnea, hypoxia    Interval history: She's on 5 liter NC O2 with a sat of 89-90% (which is her baseline). She said she had a pretty good night other than be woke up so \"they could do stuff\". She did not wear BiPAP last night. She had an echo done yesterday. No other aggravating, alleviating factors or associated symptoms.    HPI    Allergy:   Allergies   Allergen Reactions   • Ceftin [Cefuroxime Axetil]    • Codeine Sulfate    • Doxycycline    • Erythromycin    • Levaquin [Levofloxacin] Dizziness       Meds:      amoxicillin-clavulanate 1 tablet Oral Q12H   budesonide-formoterol 2 puff Inhalation BID - RT   busPIRone 5 mg Oral TID   enoxaparin 30 mg Subcutaneous Q24H   furosemide 40 mg Intravenous Once   guaiFENesin 1,200 mg Oral BID   ipratropium-albuterol 3 mL Nebulization 4x Daily - RT   methylPREDNISolone sodium succinate 60 mg Intravenous Q6H   pantoprazole 40 mg Oral QAM        Review of Systems:   Review of Systems   Constitutional: Positive for fatigue. Negative for unexpected weight change.   HENT: Negative.    Eyes: Negative.    Respiratory: Positive for cough and shortness of breath.    Cardiovascular: Negative.  Negative for chest pain.   Gastrointestinal: Negative.    Genitourinary: Negative.    Musculoskeletal: Negative.    Skin: Negative.    Neurological: Positive for weakness.   Psychiatric/Behavioral: The patient is nervous/anxious    Physical Exam:  Vitals:    12/17/17 0826   BP: 130/55   Pulse: 108   Resp: 18   Temp: 98.9 °F (37.2 °C)   SpO2: 91%       Intake/Output Summary (Last 24 hours) at 12/17/17 1121  Last " data filed at 12/17/17 1029   Gross per 24 hour   Intake              960 ml   Output              550 ml   Net              410 ml       Physical Exam  Constitutional: She is oriented to person, place, and time. She appears well-developed.   Thin and underweight, fragile appearing   HENT: NC O2 in place  Head: Normocephalic and atraumatic.   Some skin blistering and erythema  to right cheek and bridge of nose   Eyes: Pupils are equal, round, and reactive to light. No scleral icterus.   Neck: Normal range of motion. Neck supple.   Cardiovascular: Normal rate and regular rhythm.    Pulmonary/Chest: Accessory muscle usage (this is typical for her) present. She has decreased breath sounds (very diminished throughout). She has no wheezes. She has no rales.   Abdominal: Soft. Bowel sounds are normal. She exhibits no distension.   Musculoskeletal: Normal range of motion. She exhibits edema (2+ to ankles and feet).   Neurological: She is alert and oriented to person, place, and time.   Skin: Skin is warm and dry.   Psychiatric: She has a normal mood and affect.   Nursing note and vitals reviewed.    Data:     Results from last 7 days  Lab Units 12/17/17  0543 12/16/17  0037   WBC 10*3/mm3 8.18 10.94*   HEMOGLOBIN g/dL 12.0 13.8   PLATELETS 10*3/mm3 121* 148         Results from last 7 days  Lab Units 12/17/17  0543 12/16/17  0558 12/16/17  0159   SODIUM mmol/L 139 138 139   POTASSIUM mmol/L 3.9 4.9 4.7   BUN mg/dL 28* 18 17   CREATININE mg/dL 0.76 0.67 0.66         Results from last 7 days  Lab Units 12/17/17  0415 12/16/17  0629 12/16/17  0023   PH, ARTERIAL pH units 7.341* 7.376 7.394   PCO2, ARTERIAL mm Hg 71.1* 56.5* 54.0*   PO2 ART mm Hg 77.9* 74.5* 72.5*   FIO2 %  --  50 70     Radiology:  Imaging Results (last 24 hours)     ** No results found for the last 24 hours. **      2D ECHO:  · Left ventricular systolic function is normal. Estimated EF = 65%.  · Left ventricular diastolic dysfunction (grade I a) consistent  with impaired relaxation.  · Mild to moderate tricuspid valve regurgitation is present.  · Severe pulmonary hypertension is present.  · Calculated right ventricular systolic pressure from tricuspid regurgitation is 90.7 mmHg  · Right ventricular cavity is mildly dilated. RV:LV ratio >1  · RV function is normal        Pulmonary Assessment:    1. Acute on chronic hypercapnic, hypoxemic respiratory failure  2. Severe COPD  3. Severe pulmonary hypertension per echo 12-16-17, secondary to her severe lung disease  4. Diastolic dysfunction per echo 12-16-17  5. Emphysema  6. Bronchiectasis  7. Anxiety    Plan:   · Lasix 40 mg again today, given the finding of pulmonary HTN, she will likely need to be on chronic diuretic therapy  · Sputum culture, so far no growth  · Continue mucinex, symbicort and Aerobika device with nebs   · Decrease solumedrol to 40 mg TID  · Now on Augmentin  · Seems to be doing well on buspar at present, will continue for anxiety   · O2 to keep sats 88-92%  · BiPAP if needed, otherwise she seems to be doing okay without it.    Electronically signed by LAWRENCE Perkins, 12/17/17, 11:21 AM     Physician substantive contribution:  Pertinent symptoms/interval history include: feels better and wants to go home but not too soon  Respiratory exam shows pertinent findings of:diminished breath sounds, crackles, stable.  Plan includes: diurese and just continue bipap prn.  Home in a couple of days.  Pulmonary htn likely group 3 related to severe underlying lung disease.  I have seen and examined patient personally, performing a face-to-face diagnostic evaluation with plan of care reviewed and developed with APRN and nursing staff. I have addended and/or modified the above history of present illness, physical examination, and assessment and plan to reflect my findings and impressions. Essential elements of the care plan were discussed with APRN above.  Agree with findings and assessment/plan as  documented above.    Electronically signed by Anirudh Martin MD, on 12/17/2017, 11:58 AM

## 2017-12-17 NOTE — PLAN OF CARE
Problem: Infection, Risk/Actual (Adult)  Goal: Identify Related Risk Factors and Signs and Symptoms  Outcome: Ongoing (interventions implemented as appropriate)  Goal: Infection Prevention/Resolution  Outcome: Ongoing (interventions implemented as appropriate)    Problem: Respiratory Insufficiency (Adult)  Goal: Identify Related Risk Factors and Signs and Symptoms  Outcome: Ongoing (interventions implemented as appropriate)  Goal: Acid/Base Balance  Outcome: Ongoing (interventions implemented as appropriate)  Goal: Effective Ventilation  Outcome: Ongoing (interventions implemented as appropriate)    Problem: Fall Risk (Adult)  Goal: Identify Related Risk Factors and Signs and Symptoms  Outcome: Ongoing (interventions implemented as appropriate)  Goal: Absence of Falls  Outcome: Ongoing (interventions implemented as appropriate)    Problem: Patient Care Overview (Adult)  Goal: Plan of Care Review  Outcome: Ongoing (interventions implemented as appropriate)    12/17/17 0314   Coping/Psychosocial Response Interventions   Plan Of Care Reviewed With patient   Patient Care Overview   Progress improving   Outcome Evaluation   Outcome Summary/Follow up Plan Safety maintained. Up with assist. No respiratory distress noted this shift. O2 at 4l/NC. IV steroids and antibiotics continue. No c/o pain.         Problem: Nutrition, Imbalanced: Inadequate Oral Intake (Adult)  Goal: Improved Oral Intake  Outcome: Ongoing (interventions implemented as appropriate)  Goal: Prevent Further Weight Loss  Outcome: Ongoing (interventions implemented as appropriate)

## 2017-12-18 NOTE — PLAN OF CARE
Problem: Respiratory Insufficiency (Adult)  Goal: Acid/Base Balance  Outcome: Ongoing (interventions implemented as appropriate)  Goal: Effective Ventilation  Outcome: Ongoing (interventions implemented as appropriate)    Problem: Fall Risk (Adult)  Goal: Absence of Falls  Outcome: Ongoing (interventions implemented as appropriate)    Problem: Patient Care Overview (Adult)  Goal: Adult Individualization and Mutuality  Outcome: Ongoing (interventions implemented as appropriate)  Goal: Discharge Needs Assessment  Outcome: Ongoing (interventions implemented as appropriate)    Problem: Nutrition, Imbalanced: Inadequate Oral Intake (Adult)  Goal: Improved Oral Intake  Outcome: Ongoing (interventions implemented as appropriate)  Goal: Prevent Further Weight Loss  Outcome: Ongoing (interventions implemented as appropriate)

## 2017-12-18 NOTE — PROGRESS NOTES
[facility] Medicine Services  INPATIENT PROGRESS NOTE    Length of Stay: 2  Date of Admission: 12/15/2017  Primary Care Physician: LAWRENCE Castillo    Subjective   Chief Complaint: shortness of breath  HPI   She wants to stop daily labs. Her arms are painful and very bruised.     She does feel that she has had slow, daily improvement.  She is back on her home dose of oxygen.  She does not wish to wear the BiPAP any longer.    Review of Systems   All pertinent negatives and positives are as above. All other systems have been reviewed and are negative unless otherwise stated.     Objective    Temp:  [97.7 °F (36.5 °C)-98.8 °F (37.1 °C)] 98.8 °F (37.1 °C)  Heart Rate:  [] 82  Resp:  [16-18] 18  BP: (108-130)/(45-77) 108/77  Physical Exam   Constitutional: She is oriented to person, place, and time. No distress.   Chronically ill appearing. Frail. Discussed with her nurse, Pavithra.    HENT:   Head: Normocephalic and atraumatic.   Eyes: Conjunctivae and EOM are normal. Pupils are equal, round, and reactive to light.   Neck: Neck supple. No JVD present.   Cardiovascular: Normal rate, regular rhythm, normal heart sounds and intact distal pulses.    Pulmonary/Chest: Effort normal and breath sounds normal. No respiratory distress. She has no wheezes. She has no rales. She exhibits no tenderness.   Diminished at the bases, but clear.   Abdominal: Soft. Bowel sounds are normal. She exhibits no distension. There is no tenderness.   Musculoskeletal: Normal range of motion. She exhibits no edema, tenderness or deformity.   Neurological: She is alert and oriented to person, place, and time. She displays normal reflexes. No cranial nerve deficit. She exhibits normal muscle tone.   Significant generalized weakness without focality.   Skin: Skin is warm and dry. No rash noted.   Psychiatric: She has a normal mood and affect. Her behavior is normal. Judgment and thought content normal.      Results Review:  I have reviewed the labs, radiology results, and diagnostic studies.    Laboratory Data:     Results from last 7 days  Lab Units 12/17/17  0543 12/16/17  0037   WBC 10*3/mm3 8.18 10.94*   HEMOGLOBIN g/dL 12.0 13.8   HEMATOCRIT % 37.1 42.0   PLATELETS 10*3/mm3 121* 148       Results from last 7 days  Lab Units 12/17/17  0543 12/16/17  0558 12/16/17  0159   SODIUM mmol/L 139 138 139   POTASSIUM mmol/L 3.9 4.9 4.7   CHLORIDE mmol/L 93* 94* 96*   CO2 mmol/L 39.0* 36.0* 35.0*   BUN mg/dL 28* 18 17   CREATININE mg/dL 0.76 0.67 0.66   CALCIUM mg/dL 8.7 9.5 9.3   BILIRUBIN mg/dL 0.5  --  0.9   ALK PHOS U/L 55  --  78   ALT (SGPT) U/L 38  --  41   AST (SGOT) U/L 24  --  24   GLUCOSE mg/dL 177* 145* 123*     Culture Data:   Blood Culture   Date Value Ref Range Status   12/16/2017 No growth at 2 days  Preliminary   12/16/2017 No growth at 2 days  Preliminary     Respiratory Culture   Date Value Ref Range Status   12/16/2017 Light growth (2+) Normal Respiratory Sherry  Preliminary   12/16/2017 Moderate growth (3+) Gram Negative Bacilli (A)  Preliminary     I have reviewed the patient current medications.     Assessment/Plan   Assessment:   1.  Acute on chronic hypoxic and hypercarbic respiratory failure.  2.  End-stage chronic obstructive pulmonary disease with acute exacerbation.  3.  Severe pulmonary hypertension with chronic cor pulmonale.  4.  Diastolic dysfunction.  5.  Bronchiectasis.  6.  Generalized anxiety disorder.  7.  History of tobacco abuse, currently in remission.    Plan:   She is back on her baseline oxygen according to pulmonary's note.     Continue inhaled bronchodilators and Symbicort.  Continue Mucinex.  Incentive spirometry as able.    Steroids have been weaned to IV Solu-Medrol every 8 hours.  She is on oral Augmentin.    The pulmonary service and started her on oral Lasix for her severe pulmonary hypertension and diastolic dysfunction.    Continue BuSpar for anxiety.    Lovenox for  DVT prophylaxis.    PT to see.     Discharge Planning: I expect the patient to be discharged to home when okay with pulmonary.  She might benefit from home health.  She tells me that her son only lives 2 houses from her and checks on her every single day.    Hal Aguilar,    12/18/17   10:01 AM

## 2017-12-18 NOTE — PLAN OF CARE
Problem: Infection, Risk/Actual (Adult)  Goal: Identify Related Risk Factors and Signs and Symptoms  Outcome: Outcome(s) achieved Date Met:  12/18/17  Goal: Infection Prevention/Resolution  Outcome: Ongoing (interventions implemented as appropriate)    Problem: Respiratory Insufficiency (Adult)  Goal: Identify Related Risk Factors and Signs and Symptoms  Outcome: Outcome(s) achieved Date Met:  12/18/17  Goal: Acid/Base Balance  Outcome: Ongoing (interventions implemented as appropriate)  Goal: Effective Ventilation  Outcome: Ongoing (interventions implemented as appropriate)    Problem: Fall Risk (Adult)  Goal: Identify Related Risk Factors and Signs and Symptoms  Outcome: Outcome(s) achieved Date Met:  12/18/17  Goal: Absence of Falls  Outcome: Ongoing (interventions implemented as appropriate)    Problem: Patient Care Overview (Adult)  Goal: Plan of Care Review  Outcome: Ongoing (interventions implemented as appropriate)    12/18/17 0511   Coping/Psychosocial Response Interventions   Plan Of Care Reviewed With patient   Patient Care Overview   Progress no change   Outcome Evaluation   Outcome Summary/Follow up Plan Pt up with minimal assist. Desats to 77% with ambulation. O2 on 5L/NC sats stable at rest. Encourage PO intake         Problem: Nutrition, Imbalanced: Inadequate Oral Intake (Adult)  Goal: Improved Oral Intake  Outcome: Ongoing (interventions implemented as appropriate)  Goal: Prevent Further Weight Loss  Outcome: Ongoing (interventions implemented as appropriate)

## 2017-12-18 NOTE — PROGRESS NOTES
"[facility] Pulmonary Progress Note    Patient: Sophie Ortega  1940   MR# [id number]   [id number]  12/18/17   7:10 AM  Referring Provider: Hal Aguilar DO      Problem list:   Patient Active Problem List   Diagnosis   • COPD exacerbation   • Elevated troponin   • End stage COPD   • Acute on chronic respiratory failure with hypoxia and hypercapnia        Chief Complaint: Dyspnea, hypoxia    Interval history: She's on 5 liter NC O2 with a sat of 89-90% (which is her baseline). She tells me \"they're trying to kill me taking my blood\", she refused a.m.labs and had to have one IV removed due to bleeding and bruising. She has not needed to wear BiPAP.  No other aggravating, alleviating factors or associated symptoms.    Shortness of Breath         Allergy:   Allergies   Allergen Reactions   • Ceftin [Cefuroxime Axetil]    • Codeine Sulfate    • Doxycycline    • Erythromycin    • Levaquin [Levofloxacin] Dizziness       Meds:      amoxicillin-clavulanate 1 tablet Oral Q12H   budesonide-formoterol 2 puff Inhalation BID - RT   busPIRone 5 mg Oral TID   enoxaparin 30 mg Subcutaneous Q24H   furosemide 40 mg Intravenous Once   furosemide 20 mg Oral BID   guaiFENesin 1,200 mg Oral BID   ipratropium-albuterol 3 mL Nebulization 4x Daily - RT   methylPREDNISolone sodium succinate 40 mg Intravenous Q8H   pantoprazole 40 mg Oral QAM        Review of Systems:   Review of Systems   Respiratory: Positive for shortness of breath.    Constitutional: Positive for fatigue. Negative for unexpected weight change.   HENT: Negative.    Eyes: Negative.    Respiratory: Positive for cough and shortness of breath.    Cardiovascular: Negative.  Negative for chest pain.   Gastrointestinal: Negative.    Genitourinary: Negative.    Musculoskeletal: Negative.    Skin: Negative.    Neurological: Positive for weakness.   Psychiatric/Behavioral: The patient is nervous/anxious    Physical Exam:  Vitals:    12/18/17 0527   BP: 122/63   Pulse: 64 "   Resp: 16   Temp: 98.5 °F (36.9 °C)   SpO2: 95%       Intake/Output Summary (Last 24 hours) at 12/18/17 0710  Last data filed at 12/17/17 1624   Gross per 24 hour   Intake              960 ml   Output              300 ml   Net              660 ml       Physical Exam  Constitutional: She is oriented to person, place, and time. She appears well-developed.   Thin and underweight, fragile appearing   HENT: NC O2 in place  Head: Normocephalic and atraumatic.   Some skin blistering and erythema  to right cheek and bridge of nose   Eyes: Pupils are equal, round, and reactive to light. No scleral icterus.   Neck: Normal range of motion. Neck supple.   Cardiovascular: Normal rate and regular rhythm.    Pulmonary/Chest: Accessory muscle usage (this is typical for her) present. She has decreased breath sounds (very diminished throughout). She has no wheezes. She has no rales.   Abdominal: Soft. Bowel sounds are normal. She exhibits no distension.   Musculoskeletal: Normal range of motion. She exhibits improving edema to ankles.   Neurological: She is alert and oriented to person, place, and time. She is very weak.   Skin: Skin is warm and dry.   Psychiatric: She has a normal mood and affect, she is anxious which is typical for her.   Nursing note and vitals reviewed.    Data:     Results from last 7 days  Lab Units 12/17/17  0543 12/16/17  0037   WBC 10*3/mm3 8.18 10.94*   HEMOGLOBIN g/dL 12.0 13.8   PLATELETS 10*3/mm3 121* 148         Results from last 7 days  Lab Units 12/17/17  0543 12/16/17  0558 12/16/17  0159   SODIUM mmol/L 139 138 139   POTASSIUM mmol/L 3.9 4.9 4.7   BUN mg/dL 28* 18 17   CREATININE mg/dL 0.76 0.67 0.66         Results from last 7 days  Lab Units 12/17/17  0415 12/16/17  0629 12/16/17  0023   PH, ARTERIAL pH units 7.341* 7.376 7.394   PCO2, ARTERIAL mm Hg 71.1* 56.5* 54.0*   PO2 ART mm Hg 77.9* 74.5* 72.5*   FIO2 %  --  50 70     Radiology:  Imaging Results (last 24 hours)     ** No results found for  the last 24 hours. **      2D ECHO:  · Left ventricular systolic function is normal. Estimated EF = 65%.  · Left ventricular diastolic dysfunction (grade I a) consistent with impaired relaxation.  · Mild to moderate tricuspid valve regurgitation is present.  · Severe pulmonary hypertension is present.  · Calculated right ventricular systolic pressure from tricuspid regurgitation is 90.7 mmHg  · Right ventricular cavity is mildly dilated. RV:LV ratio >1  · RV function is normal        Pulmonary Assessment:    1. Acute on chronic hypercapnic, hypoxemic respiratory failure  2. Severe COPD  3. Severe pulmonary hypertension per echo 12-16-17, likely group 3 secondary to her severe lung disease  4. Diastolic dysfunction per echo 12-16-17  5. Emphysema  6. Bronchiectasis  7. Anxiety    Plan:   · Lasix 20 mg BID, given the finding of pulmonary HTN, she will likely need to be on chronic diuretic therapy  · Sputum culture, in progress  · Continue mucinex, symbicort and Aerobika device with nebs   · Solumedrol  40 mg TID  · Now on Augmentin  · Seems to be doing well on buspar at present, will continue for anxiety   · O2 to keep sats 88-92%  · BiPAP if needed, otherwise she seems to be doing okay without it.  · PT to eval    Electronically signed by LAWRENCE Perkins, 12/18/17, 7:10 AM   Physician substantive contribution:  Pertinent symptoms/interval history include: The patient appears comfortable on O2 via nasal cannula this morning.  Respiratory exam shows pertinent findings of decreased breath sounds on chest exam.  Plan includes: We will continue pulmonary toilet measures, antibiotics, and steroids.  I have seen and examined patient personally, performing a face-to-face diagnostic evaluation with plan of care reviewed and developed with APRN and nursing staff. I have addended and/or modified the above history of present illness, physical examination, and assessment and plan to reflect my findings and  impressions. Essential elements of the care plan were discussed with APRN above.  Agree with findings and assessment/plan as documented above.    Electronically signed by Kleber Parker MD, on 12/18/2017, 10:39 AM

## 2017-12-18 NOTE — PROGRESS NOTES
Continued Stay Note  ATTILA Mcgowan     Patient Name: Sophie Ortega  MRN: [id number]  Today's Date: 12/18/2017    Admit Date: 12/15/2017          Discharge Plan       12/18/17 1416    Case Management/Social Work Plan    Plan Home with [facility]    Patient/Family In Agreement With Plan yes    Additional Comments Rec'd a referral for possible home health for pt. Discussed this with the pt and she said she would like home health with PT at least. Discussed agencies in her county and she requests [facility]. Informed Gloria [id number].              Discharge Codes     None            JV Garcia

## 2017-12-19 NOTE — PROGRESS NOTES
[facility] Pulmonary Progress Note    Patient: Sophie Ortega  1940   MR# [id number]   [id number]  12/19/17   8:21 AM  Referring Provider: Hal Aguilar DO      Problem list:   Patient Active Problem List   Diagnosis   • COPD exacerbation   • Elevated troponin   • End stage COPD   • Acute on chronic respiratory failure with hypoxia and hypercapnia        Chief Complaint: Dyspnea, hypoxia    Interval history: She's on 4 liter NC O2 with a sat of 98%, while sleeping. She was visibly exhausted yesterday and is finally getting some rest. She continues to desat with ambulation according to nursing noted. She's growing some pseudomonas in her sputum and currently on Augmentin. No other aggravating, alleviating factors or associated symptoms.    Shortness of Breath         Allergy:   Allergies   Allergen Reactions   • Ceftin [Cefuroxime Axetil]    • Codeine Sulfate    • Doxycycline    • Erythromycin    • Levaquin [Levofloxacin] Dizziness       Meds:      amoxicillin-clavulanate 1 tablet Oral Q12H   budesonide-formoterol 2 puff Inhalation BID - RT   busPIRone 5 mg Oral TID   enoxaparin 30 mg Subcutaneous Q24H   furosemide 40 mg Intravenous Once   furosemide 20 mg Oral BID   guaiFENesin 1,200 mg Oral BID   ipratropium-albuterol 3 mL Nebulization 4x Daily - RT   methylPREDNISolone sodium succinate 40 mg Intravenous Q8H   pantoprazole 40 mg Oral QAM        Review of Systems:   Review of Systems   Respiratory: Positive for shortness of breath.    Constitutional: Positive for fatigue. Negative for unexpected weight change.   HENT: Negative.    Eyes: Negative.    Respiratory: Positive for cough and shortness of breath.    Cardiovascular: Negative.  Negative for chest pain.   Gastrointestinal: Negative.    Genitourinary: Negative.    Musculoskeletal: Negative.    Skin: Negative.    Neurological: Positive for weakness.    Physical Exam:  Vitals:    12/19/17 0816   BP:    Pulse:    Resp:    Temp:    SpO2: 92%        Intake/Output Summary (Last 24 hours) at 12/19/17 0821  Last data filed at 12/18/17 0900   Gross per 24 hour   Intake              240 ml   Output                0 ml   Net              240 ml       Physical Exam  Constitutional: She is oriented to person, place, and time. She appears well-developed.   Thin and underweight, fragile appearing   HENT: NC O2 in place  Head: Normocephalic and atraumatic.   Some skin blistering and erythema  to right cheek and bridge of nose-healing   Eyes: Pupils are equal, round, and reactive to light. No scleral icterus.   Neck: Normal range of motion. Neck supple.   Cardiovascular: Normal rate and regular rhythm.    Pulmonary/Chest: She has decreased breath sounds (very diminished throughout). She has no wheezes. She has no rales.   Abdominal: Soft. Bowel sounds are normal. She exhibits no distension.   Musculoskeletal: Normal range of motion. She exhibits improving edema to ankles.   Neurological: She is alert and oriented to person, place, and time. She is very weak.   Skin: Skin is warm and dry.   Psychiatric: She has a normal mood and affect, she is anxious which is typical for her.   Nursing note and vitals reviewed.    Data:     Results from last 7 days  Lab Units 12/17/17  0543 12/16/17  0037   WBC 10*3/mm3 8.18 10.94*   HEMOGLOBIN g/dL 12.0 13.8   PLATELETS 10*3/mm3 121* 148         Results from last 7 days  Lab Units 12/17/17  0543 12/16/17  0558 12/16/17  0159   SODIUM mmol/L 139 138 139   POTASSIUM mmol/L 3.9 4.9 4.7   BUN mg/dL 28* 18 17   CREATININE mg/dL 0.76 0.67 0.66         Results from last 7 days  Lab Units 12/17/17  0415 12/16/17  0629 12/16/17  0023   PH, ARTERIAL pH units 7.341* 7.376 7.394   PCO2, ARTERIAL mm Hg 71.1* 56.5* 54.0*   PO2 ART mm Hg 77.9* 74.5* 72.5*   FIO2 %  --  50 70     Radiology:  Imaging Results (last 24 hours)     ** No results found for the last 24 hours. **      2D ECHO:  · Left ventricular systolic function is normal. Estimated EF =  65%.  · Left ventricular diastolic dysfunction (grade I a) consistent with impaired relaxation.  · Mild to moderate tricuspid valve regurgitation is present.  · Severe pulmonary hypertension is present.  · Calculated right ventricular systolic pressure from tricuspid regurgitation is 90.7 mmHg  · Right ventricular cavity is mildly dilated. RV:LV ratio >1  · RV function is normal        Pulmonary Assessment:    1. Acute on chronic hypercapnic, hypoxemic respiratory failure  2. Severe COPD  3. Severe pulmonary hypertension per echo 12-16-17, likely group 3 secondary to her severe lung disease  4. Diastolic dysfunction per echo 12-16-17  5. Emphysema  6. Bronchiectasis  7. Anxiety  8. +pseudomonas in sputum 12-19-17    Plan:   · Lasix 20 mg BID, given the finding of pulmonary HTN, she will likely need to be on chronic diuretic therapy  · Sputum culture reviewed, will continue Augmentin for now   · Continue mucinex, symbicort and Aerobika device with nebs   · Solumedrol  40 mg BID  · Continue on Augmentin, will defer to Dr. Parker if he feels she needs IV abx coverage?  · Is doing well on buspar at present, will continue for anxiety   · O2 to keep sats 88-92%  · BiPAP if needed, otherwise she seems to be doing okay without it.  · PT to eval    Electronically signed by LAWRENCE Perkins, 12/19/17, 8:21 AM    Physician substantive contribution:  Pertinent symptoms/interval history include: She is comfortable this afternoon with O2 saturations in the high 80s on nasal cannula.  Respiratory exam shows pertinent findings of decreased breath sounds on chest exam.  Plan includes: She appears fairly stable so we will continue her on oral antibiotic therapy.  I have seen and examined patient personally, performing a face-to-face diagnostic evaluation with plan of care reviewed and developed with APRN and nursing staff. I have addended and/or modified the above history of present illness, physical examination, and  assessment and plan to reflect my findings and impressions. Essential elements of the care plan were discussed with APRN above.  Agree with findings and assessment/plan as documented above.    Electronically signed by Kleber Parker MD, on 12/19/2017, 4:33 PM

## 2017-12-19 NOTE — PLAN OF CARE
Problem: Patient Care Overview (Adult)  Goal: Plan of Care Review  Outcome: Ongoing (interventions implemented as appropriate)    12/19/17 1501   Coping/Psychosocial Response Interventions   Plan Of Care Reviewed With patient   Outcome Evaluation   Outcome Summary/Follow up Plan PT IE complete. Pt ambulated ~12 feet cga x1 w/ RW. Limited by decrease endurance. O2 sat drops to low 70's on 5L NC w/ exertion. Pt to receive skilled PT to address functional mobilitty and impaired endurance. Recommend home health at D/C. Thank you for referral.         Problem: Inpatient Physical Therapy  Goal: Bed Mobility Goal LTG- PT  Outcome: Ongoing (interventions implemented as appropriate)    12/19/17 1501   Bed Mobility PT LTG   Bed Mobility PT LTG, Date Established 12/19/17   Bed Mobility PT LTG, Time to Achieve by discharge   Bed Mobility PT LTG, Activity Type all bed mobility   Bed Mobility PT LTG, Adel Level independent       Goal: Transfer Training Goal 1 LTG- PT  Outcome: Ongoing (interventions implemented as appropriate)    12/19/17 1501   Transfer Training PT LTG   Transfer Training PT LTG, Date Established 12/19/17   Transfer Training PT LTG, Time to Achieve by discharge   Transfer Training PT LTG, Activity Type bed to chair /chair to bed;sit to stand/stand to sit   Transfer Training PT LTG, Adel Level supervision required       Goal: Gait Training Goal LTG- PT  Outcome: Ongoing (interventions implemented as appropriate)    12/19/17 1501   Gait Training PT LTG   Gait Training Goal PT LTG, Date Established 12/19/17   Gait Training Goal PT LTG, Time to Achieve by discharge   Gait Training Goal PT LTG, Adel Level supervision required   Gait Training Goal PT LTG, Distance to Achieve 100   Gait Training Goal PT LTG, Additional Goal w/ O2       Goal: Stair Training Goal LTG- PT  Outcome: Ongoing (interventions implemented as appropriate)    12/19/17 1501   Stair Training PT LTG   Stair Training Goal PT  LTG, Date Established 12/19/17   Stair Training Goal PT LTG, Time to Achieve by discharge   Stair Training Goal PT LTG, Number of Steps 5   Stair Training Goal PT LTG, Bullock Level contact guard assist   Stair Training Goal PT LTG, Assist Device 1 handrail

## 2017-12-19 NOTE — THERAPY EVALUATION
Acute Care - Physical Therapy Initial Evaluation  [facility]     Patient Name: Sophie Ortega  : 1940  MRN: [id number]  Today's Date: 2017   Onset of Illness/Injury or Date of Surgery Date: 12/15/17  Date of Referral to PT: 17  Referring Physician: LAWRENCE Paniagua      Admit Date: 12/15/2017     Visit Dx:    ICD-10-CM ICD-9-CM   1. COPD exacerbation J44.1 491.21   2. Impaired mobility Z74.09 799.89     Patient Active Problem List   Diagnosis   • COPD exacerbation   • Elevated troponin   • End stage COPD   • Acute on chronic respiratory failure with hypoxia and hypercapnia     Past Medical History:   Diagnosis Date   • Bronchiectasis    • Chronic respiratory failure    • COPD (chronic obstructive pulmonary disease)      Past Surgical History:   Procedure Laterality Date   • HYSTERECTOMY            PT ASSESSMENT (last 72 hours)      PT Evaluation       17 1415 17 1115    Rehab Evaluation    Document Type evaluation  - --  -    Subjective Information agree to therapy;complains of;weakness;fatigue;dyspnea  -     Evaluation Not Performed, Comment  arrived to complete PT consult. pt on bed pan. will return in pm.   -    General Information    Patient Profile Review yes  -     Onset of Illness/Injury or Date of Surgery Date 12/15/17  -     Referring Physician LAWRENCE Paniagua  -     General Observations awake, alert, fowlers in bed  -     Pertinent History Of Current Problem weakness, Dx:  AE respiratory failure w/ hypoxia and hypercapnia  -     Precautions/Limitations oxygen therapy device and L/min;fall precautions  -     Prior Level of Function independent:;all household mobility;ADL's;dependent:;driving  -     Equipment Currently Used at Home oxygen;shower chair  -     Plans/Goals Discussed With patient;agreed upon  -     Risks Reviewed patient:;LOB;nausea/vomiting;dizziness;increased discomfort;change in vital signs  -      Benefits Reviewed patient:;improve function;increase independence;increase strength;increase balance  -     Barriers to Rehab previous functional deficit  -     Living Environment    Lives With alone  -     Living Arrangements apartment  -     Home Accessibility bed and bath on same level;grab bars present (toilet);stairs to enter home;tub/shower is not walk in  -     Number of Stairs to Enter Home 5  -     Stair Railings at Home outside, present on left side  -     Clinical Impression    Date of Referral to PT 12/18/17  [facility]     PT Diagnosis impaired mobility  -     Patient/Family Goals Statement return home  -     Criteria for Skilled Therapeutic Interventions Met yes;treatment indicated  -     Rehab Potential good, to achieve stated therapy goals  -     Predicted Duration of Therapy Intervention (days/wks) until d/c  -     Vital Signs    Intratreatment Heart Rate (beats/min) 104  -     Posttreatment Heart Rate (beats/min) 75  -LH     Pre SpO2 (%) 98  -LH     O2 Delivery Pre Treatment supplemental O2   5L NC  -     Intra SpO2 (%) 73  -LH     O2 Delivery Intra Treatment supplemental O2   5L NC  -     Post SpO2 (%) 90  -LH     O2 Delivery Post Treatment supplemental O2   5LNC  -LH     Intra Patient Position Sitting  -     Post Patient Position Sitting  -     Pain Assessment    Pain Assessment No/denies pain  -     Cognitive Assessment/Intervention    Current Cognitive/Communication Assessment functional  -     Orientation Status oriented x 4  -     Follows Commands/Answers Questions able to follow multi-step instructions;100% of the time  -     Personal Safety WNL/WFL  -     Personal Safety Interventions fall prevention program maintained;nonskid shoes/slippers when out of bed  -     ROM (Range of Motion)    General ROM no range of motion deficits identified  -     MMT (Manual Muscle Testing)    General MMT Assessment upper extremity strength deficits identified;lower  extremity strength deficits identified  -     Upper Extremity    Upper Ext Manual Muscle Testing Detail 4/5  -     Lower Extremity    Lower Ext Manual Muscle Testing Detail 4-/5  -     Bed Mobility, Assessment/Treatment    Bed Mobility, Assistive Device head of bed elevated  -     Bed Mob, Supine to Sit, Bennett supervision required  -     Bed Mob, Sit to Supine, Bennett supervision required  -     Transfer Assessment/Treatment    Transfers, Sit-Stand Bennett contact guard assist  -     Transfers, Stand-Sit [place] contact guard assist  -     Transfer, Safety Issues balance decreased during turns;loses balance backward  -     Transfer, Impairments impaired balance;strength decreased  -     Gait Assessment/Treatment    Gait, [place] Level contact guard assist  -     Gait, Assistive Device rolling walker  -     Gait, Distance (Feet) 12  -LH     Gait, Gait Deviations forward flexed posture;step length decreased  -     Gait, Safety Issues balance decreased during turns;step length decreased;loses balance backward  -     Gait, Impairments impaired balance;strength decreased  -     Positioning and Restraints    Pre-Treatment Position in bed  -     Post Treatment Position bed  -     In Bed fowlers;call light within reach;encouraged to call for assist;side rails up x2  -       12/18/17 3846       General Information    Equipment Currently Used at Home none  -DS     Living Environment    Transportation Available car  -DS       User Key  (r) = Recorded By, (t) = Taken By, (c) = Cosigned By    Initials Name Provider Type     Negro Ortega PT Physical Therapist     Pavithra Evangelista, RN Registered Nurse          Physical Therapy Education     Title: PT OT SLP Therapies (Done)     Topic: Physical Therapy (Done)     Point: Mobility training (Done)    Learning Progress Summary    Learner Readiness Method Response Comment Documented by Status   Patient Acceptance E,D  [employer] benefits of PT and POC, call for assist w/ mobility  12/19/17 1500 Done               Point: Precautions (Done)    Learning Progress Summary    Learner Readiness Method Response Comment Documented by Status   Patient Acceptance E,D [employer] benefits of PT and POC, call for assist w/ mobility  12/19/17 1500 Done                      User Key     Initials Effective Dates Name Provider Type Discipline     08/02/16 -  Negro Ortega, PT Physical Therapist PT                PT Recommendation and Plan  Planned Therapy Interventions: balance training, bed mobility training, gait training, home exercise program, patient/family education, stair training, strengthening, transfer training  PT Frequency: daily, 2 times/day, per priority policy  Plan of Care Review  Plan Of Care Reviewed With: patient  Outcome Summary/Follow up Plan: PT IE complete.  Pt ambulated ~12 feet cga x1 w/ RW.  Limited by decrease endurance.  O2 sat drops to low 70's on 5L NC w/ exertion.  Pt to receive skilled PT to address functional mobilitty and impaired endurance.  Recommend home health at [facility]. Thank you for referral.          IP PT Goals       12/19/17 1501          Bed Mobility PT LTG    Bed Mobility PT LTG, Date Established 12/19/17  [facility]      Bed Mobility PT LTG, Time to Achieve by discharge  -      Bed Mobility PT LTG, Activity Type all bed mobility  -      Bed Mobility PT LTG, [place] Level independent  -      Transfer Training PT LTG    Transfer Training PT LTG, Date Established 12/19/17  -      Transfer Training PT LTG, Time to Achieve by discharge  -      Transfer Training PT LTG, Activity Type bed to chair /chair to bed;sit to stand/stand to sit  -      Transfer Training PT LTG, [place] Level supervision required  -      Gait Training PT LTG    Gait Training Goal PT LTG, Date Established 12/19/17  -      Gait Training Goal PT LTG, Time to Achieve by discharge  -      Gait Training Goal PT LTG,  [place] Level supervision required  -      Gait Training Goal PT LTG, Distance to Achieve 100  -      Gait Training Goal PT LTG, Additional Goal w/ O2  -      Stair Training PT LTG    Stair Training Goal PT LTG, Date Established 12/19/17  -      Stair Training Goal PT LTG, Time to Achieve by discharge  -      Stair Training Goal PT LTG, Number of Steps 5  -      Stair Training Goal PT LTG, [place] Level contact guard assist  -      Stair Training Goal PT LTG, Assist Device 1 handrail  -        User Key  (r) = Recorded By, (t) = Taken By, (c) = Cosigned By    Initials Name Provider Type     Negro Ortega PT Physical Therapist                Outcome Measures       12/19/17 5993          How much help from another person do you currently need...    Turning from your back to your side while in flat bed without using bedrails? 4  -LH      Moving from lying on back to sitting on the side of a flat bed without bedrails? 4  -LH      Moving to and from a bed to a chair (including a wheelchair)? 3  -LH      Standing up from a chair using your arms (e.g., wheelchair, bedside chair)? 4  -LH      Climbing 3-5 steps with a railing? 2  -LH      To walk in hospital room? 3  -      AM-[facility] 6 Clicks Score 20  -      Functional Assessment    Outcome Measure Options AM-PAC 6 Clicks Basic Mobility (PT)  -        User Key  (r) = Recorded By, (t) = Taken By, (c) = Cosigned By    Initials Name Provider Type     Negro Ortega PT Physical Therapist           Time Calculation:         PT Charges       12/19/17 4099          Time Calculation    Start Time 1415  -      Stop Time 1510  -      Time Calculation (min) 55 min  -      PT Received On 12/19/17  -      PT Goal Re-Cert Due Date 12/29/17  -        User Key  (r) = Recorded By, (t) = Taken By, (c) = Cosigned By    Initials Name Provider Type     Negro Ortega PT Physical Therapist          Therapy Charges for Today     Code Description Service  Date Service Provider Modifiers Qty    42523425902 HC PT MOBILITY CURRENT 12/19/2017 Negro Ortega PT GP, CJ 1    76677237606 HC PT MOBILITY PROJECTED 12/19/2017 Negro Ortega, PT GP, CI 1    34056774769 HC PT EVAL LOW COMPLEXITY 4 12/19/2017 Negro Ortega, PT GP 1          PT G-Codes  Outcome Measure Options: AM-PAC 6 Clicks Basic Mobility (PT)  Score: 20  Functional Limitation: Mobility: Walking and moving around  Mobility: Walking and Moving Around Current Status (): At least 20 percent but less than 40 percent impaired, limited or restricted  Mobility: Walking and Moving Around Goal Status (): At least 1 percent but less than 20 percent impaired, limited or restricted      Negro Ortega PT  12/19/2017

## 2017-12-19 NOTE — PLAN OF CARE
Problem: Patient Care Overview (Adult)  Goal: Plan of Care Review  Outcome: Ongoing (interventions implemented as appropriate)    12/19/17 4486   Coping/Psychosocial Response Interventions   Plan Of Care Reviewed With patient   Patient Care Overview   Progress improving   Outcome Evaluation   Outcome Summary/Follow up Plan Pt is currently on regular diet. PO intake avg 70%/3meals/2days. She has been receiving Ensure Enlive TID but tells RD she's been unable to drink it related to she's drinking coffee with whole milk TID. She is agreeable to try and drink Ensure Enlive with breakfast and lunch and then to try Ensure pudding at dinner. RD encouraged intake and will continue to follow.         Problem: Nutrition, Imbalanced: Inadequate Oral Intake (Adult)  Goal: Improved Oral Intake  Outcome: Ongoing (interventions implemented as appropriate)  Goal: Prevent Further Weight Loss  Outcome: Outcome(s) achieved Date Met:  12/19/17

## 2017-12-19 NOTE — PLAN OF CARE
Problem: Infection, Risk/Actual (Adult)  Goal: Infection Prevention/Resolution  Outcome: Ongoing (interventions implemented as appropriate)    12/19/17 0246   Infection, Risk/Actual (Adult)   Infection Prevention/Resolution making progress toward outcome         Problem: Respiratory Insufficiency (Adult)  Goal: Acid/Base Balance  Outcome: Ongoing (interventions implemented as appropriate)    12/19/17 0246   Respiratory Insufficiency (Adult)   Acid/Base Balance making progress toward outcome       Goal: Effective Ventilation  Outcome: Ongoing (interventions implemented as appropriate)    12/19/17 0246   Respiratory Insufficiency (Adult)   Effective Ventilation making progress toward outcome         Problem: Fall Risk (Adult)  Goal: Absence of Falls  Outcome: Ongoing (interventions implemented as appropriate)    12/19/17 0246   Fall Risk (Adult)   Absence of Falls making progress toward outcome         Problem: Patient Care Overview (Adult)  Goal: Plan of Care Review  Outcome: Ongoing (interventions implemented as appropriate)    12/19/17 0246   Coping/Psychosocial Response Interventions   Plan Of Care Reviewed With patient   Patient Care Overview   Progress no change   Outcome Evaluation   Outcome Summary/Follow up Plan vss. pt desats into 70's with ambulation. no c/o pain. safety maintained.        Goal: Discharge Needs Assessment  Outcome: Ongoing (interventions implemented as appropriate)    Problem: Nutrition, Imbalanced: Inadequate Oral Intake (Adult)  Goal: Improved Oral Intake  Outcome: Ongoing (interventions implemented as appropriate)    12/19/17 0246   Nutrition, Imbalanced: Inadequate Oral Intake (Adult)   Improved Oral Intake making progress toward outcome       Goal: Prevent Further Weight Loss  Outcome: Ongoing (interventions implemented as appropriate)    12/19/17 0246   Nutrition, Imbalanced: Inadequate Oral Intake (Adult)   Prevent Further Weight Loss making progress toward outcome

## 2017-12-19 NOTE — PROGRESS NOTES
[facility]  INPATIENT PROGRESS NOTE    Length of Stay: 3  Date of Admission: 12/15/2017  Primary Care Physician: LAWRENCE Castillo    Subjective   Chief Complaint: shortness of breath  HPI   Slow improvement. Still with some desaturations with activity and dyspnea on exertion.     I tried to explain to her that she does not clinically have pneumonia.  The Pseudomonas on her sputum culture is likely colonization given her history of severe lung disease and bronchiectasis.    Review of Systems   All pertinent negatives and positives are as above. All other systems have been reviewed and are negative unless otherwise stated.     Objective    Temp:  [97.9 °F (36.6 °C)-98.3 °F (36.8 °C)] 97.9 °F (36.6 °C)  Heart Rate:  [59-99] 78  Resp:  [16-24] 16  BP: (100-131)/(47-63) 131/54  Physical Exam  Constitutional: She is oriented to person, place, and time. No distress.   Up in bed chronically ill appearing. Frail. Seen and discussed with her nurse, Sarbjit.    Head: Normocephalic and atraumatic.   Eyes: Conjunctivae and EOM are normal. Pupils are equal, round, and reactive to light.   Neck: Neck supple. No JVD present.   Cardiovascular: Normal rate, regular rhythm, normal heart sounds and intact distal pulses.    Pulmonary/Chest: Effort normal and breath sounds normal. No respiratory distress. She has no wheezes. She has no rales. She exhibits no tenderness. Diminished at the bases, but clear.   Abdominal: Soft. Bowel sounds are normal. She exhibits no distension. There is no tenderness.   Musculoskeletal: Normal range of motion. She exhibits no edema, tenderness or deformity.   Neurological: She is alert and oriented to person, place, and time. She displays normal reflexes. No cranial nerve deficit. She exhibits normal muscle tone.   Significant generalized weakness without focality.   Skin: Skin is warm and dry. No rash noted.   Psychiatric: She has a normal mood and  affect. Her behavior is normal. Judgment and thought content normal.     Results Review:  I have reviewed the labs, radiology results, and diagnostic studies.    Laboratory Data:   Patient is currently declining blood draws.     Culture Data:   Blood Culture   Date Value Ref Range Status   12/16/2017 No growth at 3 days  Preliminary   12/16/2017 No growth at 3 days  Preliminary     Respiratory Culture   Date Value Ref Range Status   12/16/2017 Light growth (2+) Normal Respiratory Sherry  Final   12/16/2017 Moderate growth (3+) Pseudomonas aeruginosa (A)  Final     Susceptibility         Pseudomonas aeruginosa         PEPITO         Cefepime <=1  Susceptible         Ceftazidime 4  Susceptible         Gentamicin 4  Susceptible         Levofloxacin 0.25  Susceptible         Meropenem <=0.25  Susceptible         Piperacillin + Tazobactam 8  Susceptible                  I have reviewed the patient current medications.     Assessment/Plan   Assessment:   1.  Acute on chronic hypoxic and hypercarbic respiratory failure.  2.  End-stage chronic obstructive pulmonary disease with acute exacerbation.  3.  Severe pulmonary hypertension with chronic cor pulmonale.  4.  Diastolic dysfunction.  5.  Bronchiectasis.  6.  Sputum culture positive for Pseudomonas, but no clear pneumonic process.  7.  Generalized anxiety disorder.  8.  History of tobacco abuse, currently in remission.     Plan:   She is back on her baseline oxygen according to pulmonary's note.      Continue inhaled bronchodilators and Symbicort.  Continue Mucinex.  Incentive spirometry as able.     Steroids have been weaned again to IV Solu-Medrol every 12 hours.  She is on oral Augmentin. She has pan-susceptible Pseudomonas on her sputum culture.  She did not have a pneumonia on her chest x-ray.  She has run no fever.  It is quite possible that this is even colonization given her history of end-stage lung disease and bronchiectasis.  I would not use IV antibiotics for  this at this point.     The pulmonary service started her on oral Lasix for her severe pulmonary hypertension and diastolic dysfunction.     Continue BuSpar for anxiety.     Lovenox for DVT prophylaxis.     PT to see.      Discharge Planning: I expect the patient to be discharged to home when okay with pulmonary. She might benefit from home health.  She tells me that her son only lives 2 houses from her and checks on her every single day.    Hal Aguilar,    12/19/17   9:23 AM

## 2017-12-20 NOTE — PLAN OF CARE
Problem: Patient Care Overview (Adult)  Goal: Plan of Care Review  Outcome: Ongoing (interventions implemented as appropriate)

## 2017-12-20 NOTE — PLAN OF CARE
Problem: Infection, Risk/Actual (Adult)  Goal: Infection Prevention/Resolution  Outcome: Ongoing (interventions implemented as appropriate)    12/19/17 1816   Infection, Risk/Actual (Adult)   Infection Prevention/Resolution making progress toward outcome         Problem: Respiratory Insufficiency (Adult)  Goal: Acid/Base Balance  Outcome: Ongoing (interventions implemented as appropriate)    12/19/17 1816   Respiratory Insufficiency (Adult)   Acid/Base Balance making progress toward outcome       Goal: Effective Ventilation  Outcome: Ongoing (interventions implemented as appropriate)    12/19/17 1816   Respiratory Insufficiency (Adult)   Effective Ventilation making progress toward outcome         Problem: Fall Risk (Adult)  Goal: Absence of Falls  Outcome: Ongoing (interventions implemented as appropriate)    12/19/17 1816   Fall Risk (Adult)   Absence of Falls making progress toward outcome         Problem: Patient Care Overview (Adult)  Goal: Plan of Care Review  Outcome: Ongoing (interventions implemented as appropriate)    12/19/17 1816   Coping/Psychosocial Response Interventions   Plan Of Care Reviewed With patient   Patient Care Overview   Progress improving   Outcome Evaluation   Outcome Summary/Follow up Plan VSS, continuous pulse ox, Sats 87-91, no c/o pain

## 2017-12-20 NOTE — PROGRESS NOTES
Continued Stay Note  ATTILA Mcgowan     Patient Name: Sophie Ortega  MRN: [id number]  Today's Date: 12/20/2017    Admit Date: 12/15/2017          Discharge Plan       12/20/17 1049    Case Management/Social Work Plan    Plan Christianity  vs SNF    Additional Comments Concerns that pt is very weak and is a fall risk. She lives alone and her son works every day so he cannot be there all the time. Discussed possible snf with pt, along with Dr. Aguilar. She will not be ready for d/c for a few days so will let her and her son discuss it and think about it tonight. Will check back tomorrow.               Discharge Codes     None            JV Garcia

## 2017-12-20 NOTE — PLAN OF CARE
Problem: Infection, Risk/Actual (Adult)  Goal: Infection Prevention/Resolution  Outcome: Ongoing (interventions implemented as appropriate)   12/20/17 1638   Infection, Risk/Actual (Adult)   Infection Prevention/Resolution making progress toward outcome       Problem: Respiratory Insufficiency (Adult)  Goal: Acid/Base Balance  Outcome: Ongoing (interventions implemented as appropriate)   12/20/17 1638   Respiratory Insufficiency (Adult)   Acid/Base Balance making progress toward outcome     Goal: Effective Ventilation  Outcome: Ongoing (interventions implemented as appropriate)   12/20/17 1638   Respiratory Insufficiency (Adult)   Effective Ventilation making progress toward outcome       Problem: Fall Risk (Adult)  Goal: Absence of Falls  Outcome: Ongoing (interventions implemented as appropriate)   12/20/17 1638   Fall Risk (Adult)   Absence of Falls achieves outcome       Problem: Patient Care Overview (Adult)  Goal: Plan of Care Review  Outcome: Ongoing (interventions implemented as appropriate)   12/20/17 1638   Coping/Psychosocial Response Interventions   Plan Of Care Reviewed With patient   Patient Care Overview   Progress progress towards functional goals is fair   Outcome Evaluation   Outcome Summary/Follow up Plan Oxygen 4L/NC, VSS,

## 2017-12-20 NOTE — THERAPY TREATMENT NOTE
Acute Care - Physical Therapy Treatment Note  [facility]     Patient Name: Sophie Ortega  : 1940  MRN: [id number]  Today's Date: 2017  Onset of Illness/Injury or Date of Surgery Date: 12/15/17  Date of Referral to PT: 17  Referring Physician: LAWRENCE Paniagua    Admit Date: 12/15/2017    Visit Dx:    ICD-10-CM ICD-9-CM   1. COPD exacerbation J44.1 491.21   2. Impaired mobility Z74.09 799.89     Patient Active Problem List   Diagnosis   • COPD exacerbation   • Elevated troponin   • End stage COPD   • Acute on chronic respiratory failure with hypoxia and hypercapnia               Adult Rehabilitation Note       17 1459 17 1100 17 0953    Rehab Assessment/Intervention    Discipline physical therapy assistant  -KJ physical therapy assistant  -KJ --  -KJ    Document Type therapy note (daily note)  -KJ  --  -KJ    Subjective Information agree to therapy  -KJ  --  -KJ    Patient Effort, Rehab Treatment adequate  -KJ      Treatment Not Performed  patient/family declined treatment  -KJ     Treatment Not Performed, Comment  refused due to breathing  -KJ     Precautions/Limitations fall precautions;oxygen therapy device and L/min  -KJ      Recorded by [KJ] Veronica Servin PTA [KJ] Veronica Servin PTA [KJ] Veronica Servin PTA    Pain Assessment    Pain Assessment No/denies pain  -KJ      Recorded by [KJ] Veronica Servin PTA      Bed Mobility, Assessment/Treatment    Bed Mob, Supine to Sit, [place] verbal cues required;contact guard assist  -KJ      Bed Mob, Sit to Supine, [place] verbal cues required;supervision required  -KJ      Recorded by [KJ] Veronica Servin PTA      Transfer Assessment/Treatment    Transfers, Sit-Stand [place] verbal cues required;contact guard assist  -KJ      Transfers, Stand-Sit [place] verbal cues required;contact guard assist  -KJ      Toilet Transfer, [place] verbal cues required;contact guard assist;minimum assist (75%  patient effort)  -KJ      Toilet Transfer, Assistive Device rolling walker  -KJ      Recorded by [KJ] Veronica Servin PTA      Gait Assessment/Treatment    Gait, [place] Level verbal cues required;contact guard assist  -KJ      Gait, Assistive Device rolling walker  -KJ      Gait, Distance (Feet) 50   standing rest 50' back to room  -KJ      Gait, Gait Deviations torres decreased;step length decreased  -KJ      Gait, Safety Issues supplemental O2  -KJ      Gait, Impairments strength decreased  -KJ      Gait, Comment standing rest to recover breath  -KJ      Recorded by [KJ] Veronica Servin PTA      Therapy Exercises    Bilateral Lower Extremities AROM:;15 reps  -KJ      Recorded by [KJ] Veronica Servin PTA      Positioning and Restraints    Pre-Treatment Position in bed  -KJ      Post Treatment Position bed  -KJ      Recorded by [KJ] Veronica Servin PTA        User Key  (r) = Recorded By, (t) = Taken By, (c) = Cosigned By    Initials Name Effective Dates    MARA Servin PTA 08/02/16 -                  PT Goals       12/19/17 1501          Bed Mobility PT LTG    Bed Mobility PT LTG, Date Established 12/19/17  -      Bed Mobility PT LTG, Time to Achieve by discharge  -      Bed Mobility PT LTG, Activity Type all bed mobility  -      Bed Mobility PT LTG, [place] Level independent  -      Transfer Training PT LTG    Transfer Training PT LTG, Date Established 12/19/17  -      Transfer Training PT LTG, Time to Achieve by discharge  -      Transfer Training PT LTG, Activity Type bed to chair /chair to bed;sit to stand/stand to sit  -      Transfer Training PT LTG, [place] Level supervision required  -      Gait Training PT LTG    Gait Training Goal PT LTG, Date Established 12/19/17  -      Gait Training Goal PT LTG, Time to Achieve by discharge  -      Gait Training Goal PT LTG, [place] Level supervision required  -      Gait Training Goal PT LTG, Distance to Achieve 100   -      Gait Training Goal PT LTG, Additional Goal w/ O2  -      Stair Training PT LTG    Stair Training Goal PT LTG, Date Established 12/19/17  -      Stair Training Goal PT LTG, Time to Achieve by discharge  -      Stair Training Goal PT LTG, Number of Steps 5  -      Stair Training Goal PT LTG, [place] Level contact guard assist  -      Stair Training Goal PT LTG, Assist Device 1 handrail  -        User Key  (r) = Recorded By, (t) = Taken By, (c) = Cosigned By    Initials Name Provider Type     Negro Ortega, PT Physical Therapist          Physical Therapy Education     Title: PT OT SLP Therapies (Active)     Topic: Physical Therapy (Active)     Point: Mobility training (Active)    Learning Progress Summary    Learner Readiness Method Response Comment Documented by Status   Patient Acceptance E NR pursed lip breathing  12/20/17 1530 Active    Acceptance E,D DU,VU benefits of PT and POC, call for assist w/ mobility  12/19/17 1500 Done               Point: Precautions (Done)    Learning Progress Summary    Learner Readiness Method Response Comment Documented by Status   Patient Acceptance E,D DU,VU benefits of PT and POC, call for assist w/ mobility  12/19/17 1500 Done                      User Key     Initials Effective Dates Name Provider Type Discipline     08/02/16 -  Negro Ortega, PT Physical Therapist PT     08/02/16 -  Veronica Servin PTA Physical Therapy Assistant PT                    PT Recommendation and Plan  Planned Therapy Interventions: balance training, bed mobility training, gait training, home exercise program, patient/family education, stair training, strengthening, transfer training  PT Frequency: daily, 2 times/day, per priority policy  Plan of Care Review  Plan Of Care Reviewed With: patient  Progress: progress towards functional goals is fair  Outcome Summary/Follow up Plan: T tx completed. Pt supine in bed c/o fatigue. Resting sat 94%, exercise sat 73%, took  several minutes for sat to increase to 87%. CG bed mobility and transfers. AMb 50' x 2 with r wx CG.Benefit   from home health at time of discharge.          Outcome Measures       12/19/17 1458          How much help from another person do you currently need...    Turning from your back to your side while in flat bed without using bedrails? 4  -LH      Moving from lying on back to sitting on the side of a flat bed without bedrails? 4  -LH      Moving to and from a bed to a chair (including a wheelchair)? 3  -LH      Standing up from a chair using your arms (e.g., wheelchair, bedside chair)? 4  -LH      Climbing 3-5 steps with a railing? 2  -LH      To walk in hospital room? 3  -      AM-PAC 6 Clicks Score 20  -      Functional Assessment    Outcome Measure Options AM-PAC 6 Clicks Basic Mobility (PT)  -        User Key  (r) = Recorded By, (t) = Taken By, (c) = Cosigned By    Initials Name Provider Type     Negro Ortega, PT Physical Therapist           Time Calculation:         PT Charges       12/20/17 1459          Time Calculation    Start Time 1459  -KJ      Stop Time 1522  -KJ      Time Calculation (min) 23 min  -KJ      PT Received On 12/20/17  -KJ      PT Goal Re-Cert Due Date 12/29/17  -KJ      Time Calculation- PT    Total Timed Code Minutes- PT 23 minute(s)  -KJ        User Key  (r) = Recorded By, (t) = Taken By, (c) = Cosigned By    Initials Name Provider Type     Veronica Servin PTA Physical Therapy Assistant          Therapy Charges for Today     Code Description Service Date Service Provider Modifiers Qty    39862492874 HC GAIT TRAINING EA 15 MIN 12/20/2017 Veronica Servin PTA GP 1    15201836113 HC PT THER PROC EA 15 MIN 12/20/2017 Veronica Servin PTA GP 1          PT G-Codes  Outcome Measure Options: AM-PAC 6 Clicks Basic Mobility (PT)  Score: 20  Functional Limitation: Mobility: Walking and moving around  Mobility: Walking and Moving Around Current Status (): At least 20 percent but  less than 40 percent impaired, limited or restricted  Mobility: Walking and Moving Around Goal Status (): At least 1 percent but less than 20 percent impaired, limited or restricted    Veronica Servin, PTA  12/20/2017

## 2017-12-20 NOTE — PROGRESS NOTES
[facility] Medicine Services  INPATIENT PROGRESS NOTE    Length of Stay: 4  Date of Admission: 12/15/2017  Primary Care Physician: LAWRENCE Castillo    Subjective   Chief Complaint: shortness of breath  HPI   Slow improvement. But at present she is significantly dyspneic after getting up to go to the bathroom.  She is currently on 5 L nasal cannula and struggling to get her sats back up in the upper 80s.    Review of Systems   All pertinent negatives and positives are as above. All other systems have been reviewed and are negative unless otherwise stated.     Objective    Temp:  [96.8 °F (36 °C)-97.8 °F (36.6 °C)] 97.5 °F (36.4 °C)  Heart Rate:  [] 98  Resp:  [16-19] 16  BP: (114-139)/(50-62) 129/62  Physical Exam  Constitutional: She is oriented to person, place, and time. No distress.   Up in bed chronically ill appearing. Frail. Her son is present today. Seen and discussed with Peggy TELLEZ. Discussed with her nurse, Kimberly.     Head: Normocephalic and atraumatic.   Eyes: Conjunctivae and EOM are normal. Pupils are equal, round, and reactive to light.   Neck: Neck supple. No JVD present.   Cardiovascular: Normal rate, regular rhythm, normal heart sounds and intact distal pulses.    Pulmonary/Chest: Effort normal and breath sounds normal. No respiratory distress. She has no wheezes. She has no rales. She exhibits no tenderness. Diminished at the bases, but clear.   Abdominal: Soft. Bowel sounds are normal. She exhibits no distension. There is no tenderness.   Musculoskeletal: Normal range of motion. She exhibits no edema, tenderness or deformity.   Neurological: She is alert and oriented to person, place, and time. She displays normal reflexes. No cranial nerve deficit. She exhibits normal muscle tone.   Significant generalized weakness without focality.   Skin: Skin is warm and dry. No rash noted.   Psychiatric: She has a normal mood and affect. Her behavior is  normal. Judgment and thought content normal.     Results Review:  I have reviewed the labs, radiology results, and diagnostic studies.    Laboratory Data:   Patient declining to be stuck.     Culture Data:   Blood Culture   Date Value Ref Range Status   12/16/2017 No growth at 4 days  Preliminary   12/16/2017 No growth at 4 days  Preliminary     Respiratory Culture   Date Value Ref Range Status   12/16/2017 Light growth (2+) Normal Respiratory Sherry  Final   12/16/2017 Moderate growth (3+) Pseudomonas aeruginosa (A)  Final     I have reviewed the patient current medications.     Assessment/Plan     Assessment:   1.  Acute on chronic hypoxic and hypercarbic respiratory failure.  2.  End-stage chronic obstructive pulmonary disease with acute exacerbation.  3.  Severe pulmonary hypertension with chronic cor pulmonale.  4.  Diastolic dysfunction.  5.  Bronchiectasis.  6.  Sputum culture positive for Pseudomonas, but no clear pneumonic process.  7.  Generalized anxiety disorder.  8.  History of tobacco abuse, currently in remission.      Plan:   Continue inhaled bronchodilators and Symbicort.  Continue Mucinex.  Incentive spirometry as able.      Steroids have been weaned again to IV Solu-Medrol every 12 hours.  She is on oral Augmentin. She has pan-susceptible Pseudomonas on her sputum culture.  She did not have a pneumonia on her chest x-ray.  She has run no fever.  It is quite possible that this is even colonization given her history of end-stage lung disease and bronchiectasis.  I would not use IV antibiotics for this at this point.      The pulmonary service started her on oral Lasix for her severe pulmonary hypertension and diastolic dysfunction.      Continue BuSpar for anxiety.      Lovenox for DVT prophylaxis.      PT to see.      Discharge Planning: I expect the patient to be discharged when okay with pulmonary. Recommend home health versus SNF.  She tells me that her son only lives 2 houses from her and checks  on her every single day.     Hal Aguilar,    12/20/17   10:37 AM

## 2017-12-20 NOTE — PLAN OF CARE
Problem: Infection, Risk/Actual (Adult)  Goal: Infection Prevention/Resolution  Outcome: Ongoing (interventions implemented as appropriate)      Problem: Respiratory Insufficiency (Adult)  Goal: Acid/Base Balance  Outcome: Ongoing (interventions implemented as appropriate)    Goal: Effective Ventilation  Outcome: Ongoing (interventions implemented as appropriate)      Problem: Fall Risk (Adult)  Goal: Absence of Falls  Outcome: Ongoing (interventions implemented as appropriate)      Problem: Patient Care Overview (Adult)  Goal: Plan of Care Review  Outcome: Ongoing (interventions implemented as appropriate)   12/20/17 0344   Coping/Psychosocial Response Interventions   Plan Of Care Reviewed With patient   Patient Care Overview   Progress progress toward functional goals as expected   Outcome Evaluation   Outcome Summary/Follow up Plan no c/o any kind so far this shift. cont to monitor     Goal: Adult Individualization and Mutuality  Outcome: Ongoing (interventions implemented as appropriate)      Problem: Nutrition, Imbalanced: Inadequate Oral Intake (Adult)  Goal: Improved Oral Intake  Outcome: Ongoing (interventions implemented as appropriate)    Goal: Prevent Further Weight Loss  Outcome: Ongoing (interventions implemented as appropriate)

## 2017-12-20 NOTE — PROGRESS NOTES
[facility] Pulmonary Progress Note    Patient: Sophie Ortega  1940   MR# [id number]   [id number]  12/20/17   8:15 AM  Referring Provider: Hal Aguilar DO      Problem list:   Patient Active Problem List   Diagnosis   • COPD exacerbation   • Elevated troponin   • End stage COPD   • Acute on chronic respiratory failure with hypoxia and hypercapnia        Chief Complaint: Dyspnea, hypoxia    Interval history: She remains on 4.5 liter NC O2 with a sat of 96% and HR 74. She indicates a goal og being able to get up and walk to the bathroom. Anxiety remains but is somewhat improved. She has been working with PT some with plans to use them at home for a short time as well. There is no family present. No other aggravating, alleviating factors or associated symptoms.    Shortness of Breath         Allergy:   Allergies   Allergen Reactions   • Ceftin [Cefuroxime Axetil]    • Codeine Sulfate    • Doxycycline    • Erythromycin    • Levaquin [Levofloxacin] Dizziness       Meds:      amoxicillin-clavulanate 1 tablet Oral Q12H   budesonide-formoterol 2 puff Inhalation BID - RT   busPIRone 5 mg Oral TID   enoxaparin 30 mg Subcutaneous Q24H   furosemide 40 mg Intravenous Once   furosemide 20 mg Oral BID   guaiFENesin 1,200 mg Oral BID   ipratropium-albuterol 3 mL Nebulization 4x Daily - RT   methylPREDNISolone sodium succinate 40 mg Intravenous Q12H   pantoprazole 40 mg Oral QAM        Review of Systems:   Review of Systems   Respiratory: Positive for shortness of breath.    Constitutional: Positive for fatigue. Negative for unexpected weight change.   HENT: Negative.    Eyes: Negative.    Respiratory: Positive for cough and shortness of breath.    Cardiovascular: Negative.  Negative for chest pain.   Gastrointestinal: Negative.    Genitourinary: Negative.    Musculoskeletal: Negative.    Skin: Negative.    Neurological: Positive for weakness.    Physical Exam:  Vitals:    12/20/17 0812   BP: 129/62   Pulse: 79   Resp: 16    Temp: 97.5 °F (36.4 °C)   SpO2: 94%       Intake/Output Summary (Last 24 hours) at 12/20/17 0815  Last data filed at 12/19/17 2300   Gross per 24 hour   Intake                0 ml   Output              850 ml   Net             -850 ml       Physical Exam  Constitutional: She is oriented to person, place, and time. She appears well-developed.   Thin and underweight, fragile appearing   HENT: NC O2 in place  Head: Normocephalic and atraumatic.   Some skin blistering and erythema  to right cheek and bridge of nose-healing   Eyes: Pupils are equal, round, and reactive to light. No scleral icterus.   Neck: Normal range of motion. Neck supple.   Cardiovascular: Normal rate and regular rhythm.    Pulmonary/Chest: She has decreased breath sounds (very diminished throughout). She has no wheezes. She has no rales.   Abdominal: Soft. Bowel sounds are normal. She exhibits no distension.   Musculoskeletal: Normal range of motion. She exhibits improving edema to ankles.   Neurological: She is alert and oriented to person, place, and time. She is very weak.   Skin: Skin is warm and dry.   Psychiatric: She has a normal mood and affect, she is anxious which is typical for her.   Nursing note and vitals reviewed.    Data:     Results from last 7 days  Lab Units 12/17/17  0543 12/16/17  0037   WBC 10*3/mm3 8.18 10.94*   HEMOGLOBIN g/dL 12.0 13.8   PLATELETS 10*3/mm3 121* 148         Results from last 7 days  Lab Units 12/17/17  0543 12/16/17  0558 12/16/17  0159   SODIUM mmol/L 139 138 139   POTASSIUM mmol/L 3.9 4.9 4.7   BUN mg/dL 28* 18 17   CREATININE mg/dL 0.76 0.67 0.66         Results from last 7 days  Lab Units 12/17/17  0415 12/16/17  0629 12/16/17  0023   PH, ARTERIAL pH units 7.341* 7.376 7.394   PCO2, ARTERIAL mm Hg 71.1* 56.5* 54.0*   PO2 ART mm Hg 77.9* 74.5* 72.5*   FIO2 %  --  50 70     Radiology:  Imaging Results (last 24 hours)     ** No results found for the last 24 hours. **      2D ECHO:  · Left ventricular  systolic function is normal. Estimated EF = 65%.  · Left ventricular diastolic dysfunction (grade I a) consistent with impaired relaxation.  · Mild to moderate tricuspid valve regurgitation is present.  · Severe pulmonary hypertension is present.  · Calculated right ventricular systolic pressure from tricuspid regurgitation is 90.7 mmHg  · Right ventricular cavity is mildly dilated. RV:LV ratio >1  · RV function is normal        Pulmonary Assessment:    1. Acute on chronic hypercapnic, hypoxemic respiratory failure  2. Severe COPD  3. Severe pulmonary hypertension per echo 12-16-17, likely group 3 secondary to her severe lung disease  4. Diastolic dysfunction per echo 12-16-17  5. Emphysema  6. Bronchiectasis  7. Anxiety  8. +pseudomonas in sputum 12-19-17    Plan:     · Responded well to the Lasix 20 mg BID, given the finding of pulmonary HTN, she will likely need to be on chronic diuretic therapy  · Complete augmentin course   · Continue mucinex, symbicort and Aerobika device with nebs   · Solumedrol  40 mg BID. Will taper additionally tomorrow if there are no new issues today  · Doing well on buspar at present, will continue for anxiety   · O2 to keep sats 88-92%  · Continue PT efforts. Will likely need them at home short term as well.     Electronically signed by LAWRENCE Ventura, 12/20/17, 8:15 AM    Physician substantive contribution:  Pertinent symptoms/interval history include: She still has complaints of being dizzy and also dyspneic with minimal exertion.  Respiratory exam shows pertinent findings of decreased breath sounds on exam.  Plan includes: We will continue her current regimen.  Again she has end-stage chronic lung disease with secondary pulmonary hypertension with a poor prognosis.  I did discuss with the son the possibility of a respiratory assist device at home and he doubts that she would wear it routinely.  I have seen and examined patient personally, performing a face-to-face diagnostic  evaluation with plan of care reviewed and developed with APRN and nursing staff. I have addended and/or modified the above history of present illness, physical examination, and assessment and plan to reflect my findings and impressions. Essential elements of the care plan were discussed with APRN above.  Agree with findings and assessment/plan as documented above.    Electronically signed by Kleber Parker MD, on 12/20/2017, 10:56 AM

## 2017-12-21 NOTE — THERAPY TREATMENT NOTE
Acute Care - Physical Therapy Treatment Note  [facility]     Patient Name: Sophie Ortega  : 1940  MRN: [id number]  Today's Date: 2017  Onset of Illness/Injury or Date of Surgery Date: 12/15/17  Date of Referral to PT: 17  Referring Physician: LAWRENCE Paniagua    Admit Date: 12/15/2017    Visit Dx:    ICD-10-CM ICD-9-CM   1. COPD exacerbation J44.1 491.21   2. Impaired mobility Z74.09 799.89     Patient Active Problem List   Diagnosis   • COPD exacerbation   • Elevated troponin   • End stage COPD   • Acute on chronic respiratory failure with hypoxia and hypercapnia               Adult Rehabilitation Note       17 1512 17 1459 17 1100    Rehab Assessment/Intervention    Discipline physical therapy assistant  -AE physical therapy assistant  -KJ physical therapy assistant  -KJ    Document Type therapy note (daily note)  -AE therapy note (daily note)  -KJ     Subjective Information agree to therapy  -AE agree to therapy  -KJ     Patient Effort, Rehab Treatment  adequate  -KJ     Treatment Not Performed   patient/family declined treatment  -KJ    Treatment Not Performed, Comment   refused due to breathing  -KJ    Precautions/Limitations fall precautions;oxygen therapy device and L/min  -AE fall precautions;oxygen therapy device and L/min  -KJ     Recorded by [AE] Megan Cleaning PTA [KJ] Veronica Servin PTA [KJ] Veronica Servin PTA    Vital Signs    Pre SpO2 (%) 90  -AE      O2 Delivery Pre Treatment supplemental O2   5L  -AE      Intra SpO2 (%) 85  -AE      O2 Delivery Intra Treatment supplemental O2   5L  -AE      Post SpO2 (%) 90  -AE      O2 Delivery Post Treatment supplemental O2   5L  -AE      Recorded by [AE] Megan Cleaning PTA      Pain Assessment    Pain Assessment No/denies pain  -AE No/denies pain  -KJ     Recorded by [AE] Megan Cleaning PTA [KJ] Veronica Servin PTA     Bed Mobility, Assessment/Treatment    Bed Mob, Supine to Sit, [place]  verbal cues  required;contact guard assist  -KJ     Bed Mob, Sit to Supine, [place]  verbal cues required;supervision required  -KJ     Bed Mobility, Comment --   Pt refused to get up due to fatigue from bath  -AE      Recorded by [AE] Megan Cleaning PTA [KJ] Veronica Servin PTA     Transfer Assessment/Treatment    Transfers, Sit-Stand [place]  verbal cues required;contact guard assist  -KJ     Transfers, Stand-Sit [place]  verbal cues required;contact guard assist  -KJ     Toilet Transfer, [place]  verbal cues required;contact guard assist;minimum assist (75% patient effort)  -KJ     Toilet Transfer, Assistive Device  rolling walker  -KJ     Recorded by  [KJ] Veronica Servin PTA     Gait Assessment/Treatment    Gait, [place] Level  verbal cues required;contact guard assist  -KJ     Gait, Assistive Device  rolling walker  -KJ     Gait, Distance (Feet)  50   standing rest 50' back to room  -KJ     Gait, Gait Deviations  torres decreased;step length decreased  -KJ     Gait, Safety Issues  supplemental O2  -KJ     Gait, Impairments  strength decreased  -KJ     Gait, Comment  standing rest to recover breath  -KJ     Recorded by  [KJ] Veronica Servin PTA     Therapy Exercises    Bilateral Lower Extremities AROM:;20 reps;ankle pumps/circles;heel slides;SAQ;SLR;hip abduction/adduction;glut sets   RROM heeel slides  -AE AROM:;15 reps  -KJ     Recorded by [AE] Megan Cleaning PTA [KJ] Veronica Servin PTA     Positioning and Restraints    Pre-Treatment Position in bed  -AE in bed  -KJ     Post Treatment Position bed  -AE bed  -KJ     In Bed fowlers;call light within reach  -AE      Recorded by [AE] Megan Cleaning PTA [KJ] Veronica Servin PTA       12/20/17 0953          Rehab Assessment/Intervention    Discipline --  -KJ      Document Type --  -KJ      Subjective Information --  -KJ      Recorded by [KJ] Veronica Servin PTA        User Key  (r) = Recorded By, (t) = Taken By, (c) = Cosigned By    Initials Name  Effective Dates    AE Megan Cleaning, PTA 06/22/15 -     KJ Veronica Servin, PTA 08/02/16 -                 IP PT Goals       12/19/17 1501          Bed Mobility PT LTG    Bed Mobility PT LTG, Date Established 12/19/17  -      Bed Mobility PT LTG, Time to Achieve by discharge  -      Bed Mobility PT LTG, Activity Type all bed mobility  -      Bed Mobility PT LTG, [place] Level independent  -      Transfer Training PT LTG    Transfer Training PT LTG, Date Established 12/19/17  -      Transfer Training PT LTG, Time to Achieve by discharge  -      Transfer Training PT LTG, Activity Type bed to chair /chair to bed;sit to stand/stand to sit  -      Transfer Training PT LTG, [place] Level supervision required  -      Gait Training PT LTG    Gait Training Goal PT LTG, Date Established 12/19/17  -      Gait Training Goal PT LTG, Time to Achieve by discharge  -      Gait Training Goal PT LTG, [place] Level supervision required  -      Gait Training Goal PT LTG, Distance to Achieve 100  -      Gait Training Goal PT LTG, Additional Goal w/ O2  -      Stair Training PT LTG    Stair Training Goal PT LTG, Date Established 12/19/17  -      Stair Training Goal PT LTG, Time to Achieve by discharge  -      Stair Training Goal PT LTG, Number of Steps 5  -      Stair Training Goal PT LTG, [place] Level contact guard assist  -      Stair Training Goal PT LTG, Assist Device 1 handrail  -        User Key  (r) = Recorded By, (t) = Taken By, (c) = Cosigned By    Initials Name Provider Type     Negro Ortega PT Physical Therapist          Physical Therapy Education     Title: PT OT SLP Therapies (Done)     Topic: Physical Therapy (Done)     Point: Mobility training (Done)    Learning Progress Summary    Learner Readiness Method Response Comment Documented by Status   Patient Eager E VU ex's AE 12/21/17 1534 Done    Acceptance E NR pursed lip breathing KJ 12/20/17 1530 Active     Acceptance E,D DU,VU benefits of PT and POC, call for assist w/ mobility  12/19/17 1500 Done               Point: Precautions (Done)    Learning Progress Summary    Learner Readiness Method Response Comment Documented by Status   Patient Acceptance E,D DU,VU benefits of PT and POC, call for assist w/ mobility  12/19/17 1500 Done                      User Key     Initials Effective Dates Name Provider Type Discipline    AE 06/22/15 -  Megan Cleaning, PTA Physical Therapy Assistant PT     08/02/16 -  Negro Ortega, PT Physical Therapist PT     08/02/16 -  Veronica Servin, PTA Physical Therapy Assistant PT                    PT Recommendation and Plan  Planned Therapy Interventions: balance training, bed mobility training, gait training, home exercise program, patient/family education, stair training, strengthening, transfer training  PT Frequency: daily, 2 times/day, per priority policy  Plan of Care Review  Plan Of Care Reviewed With: patient  Progress: no change  Outcome Summary/Follow up Plan: Pt performed B UE ex's x 10 reps, B LE ex's x 20 reps AROM-RROM. Pt's 02 dropped to 85% during ex's          Outcome Measures       12/21/17 1512 12/19/17 1458       How much help from another person do you currently need...    Turning from your back to your side while in flat bed without using bedrails? 4  -AE 4  -LH     Moving from lying on back to sitting on the side of a flat bed without bedrails? 4  -AE 4  -LH     Moving to and from a bed to a chair (including a wheelchair)? 3  -AE 3  -LH     Standing up from a chair using your arms (e.g., wheelchair, bedside chair)? 4  -AE 4  -LH     Climbing 3-5 steps with a railing? 2  -AE 2  -LH     To walk in hospital room? 3  -AE 3  -LH     AM-PAC 6 Clicks Score 20  -AE 20  -LH     Functional Assessment    Outcome Measure Options AM-PAC 6 Clicks Basic Mobility (PT)  -AE AM-PAC 6 Clicks Basic Mobility (PT)  -LH       User Key  (r) = Recorded By, (t) = Taken By, (c) = Cosigned  By    Initials Name Provider Type    AE Megan Cleaning PTA Physical Therapy Assistant     Negro Ortega, PT Physical Therapist           Time Calculation:         PT Charges       12/21/17 1538          Time Calculation    Start Time 1512  -AE      Stop Time 1536  -AE      Time Calculation (min) 24 min  -AE      PT Received On 12/21/17  -AE      PT Goal Re-Cert Due Date 12/29/17  -AE      Time Calculation- PT    Total Timed Code Minutes- PT 24 minute(s)  -AE        User Key  (r) = Recorded By, (t) = Taken By, (c) = Cosigned By    Initials Name Provider Type    AE Megan Cleaning PTA Physical Therapy Assistant          Therapy Charges for Today     Code Description Service Date Service Provider Modifiers Qty    [id number] HC PT THER PROC EA 15 MIN 12/21/2017 Megan Cleaning PTA GP 2          PT G-Codes  Outcome Measure Options: AM-PAC 6 Clicks Basic Mobility (PT)  Score: 20  Functional Limitation: Mobility: Walking and moving around  Mobility: Walking and Moving Around Current Status (): At least 20 percent but less than 40 percent impaired, limited or restricted  Mobility: Walking and Moving Around Goal Status (): At least 1 percent but less than 20 percent impaired, limited or restricted    Megan Cleaning PTA  12/21/2017

## 2017-12-21 NOTE — PLAN OF CARE
Problem: Infection, Risk/Actual (Adult)  Goal: Infection Prevention/Resolution  Outcome: Ongoing (interventions implemented as appropriate)      Problem: Respiratory Insufficiency (Adult)  Goal: Acid/Base Balance  Outcome: Ongoing (interventions implemented as appropriate)    Goal: Effective Ventilation  Outcome: Ongoing (interventions implemented as appropriate)      Problem: Fall Risk (Adult)  Goal: Absence of Falls  Outcome: Ongoing (interventions implemented as appropriate)      Problem: Patient Care Overview (Adult)  Goal: Plan of Care Review  Outcome: Ongoing (interventions implemented as appropriate)   12/21/17 0331   Coping/Psychosocial Response Interventions   Plan Of Care Reviewed With patient   Patient Care Overview   Progress progress towards functional goals is fair   Outcome Evaluation   Outcome Summary/Follow up Plan pt cont to desat with exertion. assist pt to bathroom. desat in 70's. back up in high 80's and low 90's. pt has bedside commode and bed pan. no c/o pain. cont to monitor       Problem: Nutrition, Imbalanced: Inadequate Oral Intake (Adult)  Goal: Improved Oral Intake  Outcome: Ongoing (interventions implemented as appropriate)    Goal: Prevent Further Weight Loss  Outcome: Ongoing (interventions implemented as appropriate)

## 2017-12-21 NOTE — PROGRESS NOTES
"[facility] Pulmonary Progress Note    Patient: Sophie Ortega  1940   MR# [id number]   [id number]  12/21/17   9:43 AM  Referring Provider: Hal Aguilar DO      Problem list:   Patient Active Problem List   Diagnosis   • COPD exacerbation   • Elevated troponin   • End stage COPD   • Acute on chronic respiratory failure with hypoxia and hypercapnia        Chief Complaint: Dyspnea, hypoxia    Interval history: She's on 5 liter NC O2 with a sats 88-92%. She's having significant desats when she gets up to mobilize. Attending and  discussed possible SNF placement with her and her son yesterday, she does not seem opposed but does state that she wants to go back home once she's stronger. She said she would prefer to go to \"rehab\" at the hospital but understands that it depends where she could get accepted. No other aggravating, alleviating factors or associated symptoms.    Shortness of Breath   Associated symptoms include hemoptysis.       Allergy:   Allergies   Allergen Reactions   • Ceftin [Cefuroxime Axetil]    • Codeine Sulfate    • Doxycycline    • Erythromycin    • Levaquin [Levofloxacin] Dizziness       Meds:      amoxicillin-clavulanate 1 tablet Oral Q12H   budesonide-formoterol 2 puff Inhalation BID - RT   busPIRone 5 mg Oral TID   enoxaparin 30 mg Subcutaneous Q24H   furosemide 40 mg Intravenous Once   furosemide 20 mg Oral BID   guaiFENesin 1,200 mg Oral BID   ipratropium-albuterol 3 mL Nebulization 4x Daily - RT   methylPREDNISolone sodium succinate 40 mg Intravenous Q12H   pantoprazole 40 mg Oral QAM        Review of Systems:   Review of Systems   Respiratory: Positive for hemoptysis and shortness of breath.    Constitutional: Positive for fatigue. Negative for unexpected weight change.   HENT: Negative.    Eyes: Negative.    Respiratory: Positive for cough and shortness of breath.    Cardiovascular: Negative.  Negative for chest pain.   Gastrointestinal: Negative.    Genitourinary: " Negative.    Musculoskeletal: Negative.    Skin: Negative.    Neurological: Positive for weakness.    Physical Exam:  Vitals:    12/21/17 0752   BP:    Pulse: 81   Resp: 16   Temp:    SpO2: 94%       Intake/Output Summary (Last 24 hours) at 12/21/17 0943  Last data filed at 12/21/17 0600   Gross per 24 hour   Intake              360 ml   Output                0 ml   Net              360 ml       Physical Exam  Constitutional: She is oriented to person, place, and time. She appears well-developed.   Thin and underweight, fragile appearing   HENT: NC O2 in place  Head: Normocephalic and atraumatic.   Some skin blistering and erythema  to right cheek and bridge of nose-healing   Eyes: Pupils are equal, round, and reactive to light. No scleral icterus.   Neck: Normal range of motion. Neck supple.   Cardiovascular: Normal rate and regular rhythm.    Pulmonary/Chest: She has decreased breath sounds (very diminished throughout). She has no wheezes. She has no rales.   Abdominal: Soft. Bowel sounds are normal. She exhibits no distension.   Musculoskeletal: Normal range of motion. No edema.   Neurological: She is alert and oriented to person, place, and time. She is very weak.   Skin: Skin is warm and dry.   Psychiatric: She has a normal mood and affect, she is anxious which is typical for her.   Nursing note and vitals reviewed.    Data:     Results from last 7 days  Lab Units 12/17/17  0543 12/16/17  0037   WBC 10*3/mm3 8.18 10.94*   HEMOGLOBIN g/dL 12.0 13.8   PLATELETS 10*3/mm3 121* 148         Results from last 7 days  Lab Units 12/21/17  0754 12/17/17  0543 12/16/17  0558   SODIUM mmol/L 142 139 138   POTASSIUM mmol/L 4.2 3.9 4.9   BUN mg/dL 37* 28* 18   CREATININE mg/dL 0.78 0.76 0.67         Results from last 7 days  Lab Units 12/17/17  0415 12/16/17  0629 12/16/17  0023   PH, ARTERIAL pH units 7.341* 7.376 7.394   PCO2, ARTERIAL mm Hg 71.1* 56.5* 54.0*   PO2 ART mm Hg 77.9* 74.5* 72.5*   FIO2 %  --  50 70      Radiology:  Imaging Results (last 24 hours)     ** No results found for the last 24 hours. **      2D ECHO:  · Left ventricular systolic function is normal. Estimated EF = 65%.  · Left ventricular diastolic dysfunction (grade I a) consistent with impaired relaxation.  · Mild to moderate tricuspid valve regurgitation is present.  · Severe pulmonary hypertension is present.  · Calculated right ventricular systolic pressure from tricuspid regurgitation is 90.7 mmHg  · Right ventricular cavity is mildly dilated. RV:LV ratio >1  · RV function is normal        Pulmonary Assessment:    1. Acute on chronic hypercapnic, hypoxemic respiratory failure-back to her baseline which is severely compromised   2. Severe COPD  3. Severe pulmonary hypertension per echo 12-16-17, likely group 3 secondary to her severe lung disease  4. Diastolic dysfunction per echo 12-16-17  5. Emphysema  6. Bronchiectasis  7. Anxiety, improved with Buspar  8. +pseudomonas in sputum 12-19-17, being treated with Augmentin     Plan:     · Continue Lasix 20 mg BID, given the finding of pulmonary HTN, she will likely need to be on chronic diuretic therapy  · Complete augmentin course through tomorrow   · Continue mucinex, symbicort and Aerobika device with nebs   · Solumedrol decreased to 20 mg BID, can hopefully transition to PO in the next day or so.   · Doing well on buspar at present, will continue for anxiety   · O2 to keep sats 88-92%  · Continue PT efforts. She may need SNF or rehab placement. Talked with Dr. Aguilar,  will continue to look at options.  · I will call her son Иван this week and update him on her care plan.     Electronically signed by LAWRENCE Perkins, 12/21/17, 9:43 AM     Electronically signed by Kleber Parker MD, on 12/21/2017, 5:43 PM    Physician substantive contribution:  Pertinent symptoms/interval history include: She continues to improve clinically.  Her O2 sats are running in the low  90s on nasal cannula.  Respiratory exam shows pertinent findings of decreased breath sounds on chest exam.  Plan includes: She should be ready for discharge setting.  I have seen and examined patient personally, performing a face-to-face diagnostic evaluation with plan of care reviewed and developed with APRN and nursing staff. I have addended and/or modified the above history of present illness, physical examination, and assessment and plan to reflect my findings and impressions. Essential elements of the care plan were discussed with APRN above.  Agree with findings and assessment/plan as documented above.    Electronically signed by Kleber Parker MD, on 12/21/2017, 5:43 PM

## 2017-12-21 NOTE — PLAN OF CARE
Problem: Patient Care Overview (Adult)  Goal: Plan of Care Review  Outcome: Ongoing (interventions implemented as appropriate)   12/21/17 1535   Coping/Psychosocial Response Interventions   Plan Of Care Reviewed With patient   Patient Care Overview   Progress no change   Outcome Evaluation   Outcome Summary/Follow up Plan Pt performed B UE ex's x 10 reps, B LE ex's x 20 reps AROM-RROM. Pt's 02 dropped to 85% during ex's.

## 2017-12-21 NOTE — PLAN OF CARE
Problem: Patient Care Overview (Adult)  Goal: Plan of Care Review  Outcome: Ongoing (interventions implemented as appropriate)   12/21/17 1702   Coping/Psychosocial Response Interventions   Plan Of Care Reviewed With patient;son   Patient Care Overview   Progress no change   Outcome Evaluation   Outcome Summary/Follow up Plan Pt is on Regular diet with multiple supplements ordered (see orders). PO intake avg 55%/3meals/3days. Pt states she does like the Ensure but she isn't drinking all that she's been sent. Encouraged her to take 2-3 sips of Ensure Enlive every 15 minutes to increase intake. Educated on importance of adequate kcal/protein intake. Will continue to follow.       Problem: Nutrition, Imbalanced: Inadequate Oral Intake (Adult)  Goal: Improved Oral Intake  Outcome: Ongoing (interventions implemented as appropriate)    Goal: Prevent Further Weight Loss  Outcome: Ongoing (interventions implemented as appropriate)

## 2017-12-21 NOTE — PROGRESS NOTES
[facility] Medicine Services  INPATIENT PROGRESS NOTE    Length of Stay: 5  Date of Admission: 12/15/2017  Primary Care Physician: LAWRENCE Castillo    Subjective   Chief Complaint: shortness of breath  HPI   Discussed with LAWRENCE Paniagua this morning.     The patient is very open to SNF for rehab now.     She remains very dyspneic on exertion and drops her sats easily, but this also has been her long term baseline according to Malaika.     Review of Systems   All pertinent negatives and positives are as above. All other systems have been reviewed and are negative unless otherwise stated.     Objective    Temp:  [97.1 °F (36.2 °C)-98.6 °F (37 °C)] 98.6 °F (37 °C)  Heart Rate:  [] 81  Resp:  [15-18] 16  BP: (124-143)/(54-80) 141/72  Physical Exam  Constitutional: She is oriented to person, place, and time. No distress.   Up in bed, chronically ill appearing. Frail. No family here at present. Discussed with her nurse, Nena.     Head: Normocephalic and atraumatic.   Eyes: Conjunctivae and EOM are normal. Pupils are equal, round, and reactive to light.   Neck: Neck supple. No JVD present.   Cardiovascular: Normal rate, regular rhythm, normal heart sounds and intact distal pulses.    Pulmonary/Chest: Effort normal and breath sounds normal. No respiratory distress. She has no wheezes. She has no rales. She exhibits no tenderness. Diminished at the bases, but clear.   Abdominal: Soft. Bowel sounds are normal. She exhibits no distension. There is no tenderness.   Musculoskeletal: Normal range of motion. She exhibits no edema, tenderness or deformity.   Neurological: She is alert and oriented to person, place, and time. She displays normal reflexes. No cranial nerve deficit. She exhibits normal muscle tone.   Significant generalized weakness without focality.   Skin: Skin is warm and dry. No rash noted.   Psychiatric: She has a normal mood and affect. Her  behavior is normal. Judgment and thought content normal.     Results Review:  I have reviewed the labs, radiology results, and diagnostic studies.    Laboratory Data:     Results from last 7 days  Lab Units 12/21/17  0754 12/17/17  0543 12/16/17  0558 12/16/17  0159   SODIUM mmol/L 142 139 138 139   POTASSIUM mmol/L 4.2 3.9 4.9 4.7   CHLORIDE mmol/L 85* 93* 94* 96*   CO2 mmol/L >40.0* 39.0* 36.0* 35.0*   BUN mg/dL 37* 28* 18 17   CREATININE mg/dL 0.78 0.76 0.67 0.66   CALCIUM mg/dL 8.7 8.7 9.5 9.3   BILIRUBIN mg/dL  --  0.5  --  0.9   ALK PHOS U/L  --  55  --  78   ALT (SGPT) U/L  --  38  --  41   AST (SGOT) U/L  --  24  --  24   GLUCOSE mg/dL 154* 177* 145* 123*     Culture Data:   Blood Culture   Date Value Ref Range Status   12/16/2017 No growth at 5 days  Final   12/16/2017 No growth at 5 days  Final     Respiratory Culture   Date Value Ref Range Status   12/16/2017 Light growth (2+) Normal Respiratory Sherry  Final   12/16/2017 Moderate growth (3+) Pseudomonas aeruginosa (A)  Final     I have reviewed the patient current medications.     Assessment/Plan   Assessment:   1.  Acute on chronic hypoxic and hypercarbic respiratory failure.  2.  End-stage chronic obstructive pulmonary disease with acute exacerbation.  3.  Severe pulmonary hypertension with chronic cor pulmonale.  4.  Diastolic dysfunction.  5.  Bronchiectasis.  6.  Sputum culture positive for Pseudomonas, but no clear pneumonic process.  7.  Generalized anxiety disorder.  8.  History of tobacco abuse, currently in remission.      Plan:   Continue inhaled bronchodilators and Symbicort.  Continue Mucinex.  Incentive spirometry as able.      Steroids have been weaned again to IV Solu-Medrol 20 mg every 12 hours.  She is on oral Augmentin. She has pan-susceptible Pseudomonas on her sputum culture.  She did not have a pneumonia on her chest x-ray.  She has run no fever.  It is quite possible that this is even colonization given her history of end-stage lung  disease and bronchiectasis.  I would not use IV antibiotics for this at this point.      The pulmonary service started her on oral Lasix for her severe pulmonary hypertension and diastolic dysfunction. The patient allowed me to check a BMP today that shows normal renal function and potassium.       Continue BuSpar for anxiety.      Lovenox for DVT prophylaxis.      PT to see.       Discharge Planning: I expect the patient to be discharged when okay with pulmonary. Recommend SNF, which she is open to.  She tells me that her son only lives 2 houses from her and checks on her every single day, but he also works a full time job and she has no other help.    Hal Aguilar, DO   12/21/17   10:08 AM

## 2017-12-21 NOTE — PROGRESS NOTES
Continued Stay Note  ATT[facility] Mcgowan     Patient Name: Sophie Ortega  MRN: [id number]  Today's Date: 12/21/2017    Admit Date: 12/15/2017          Discharge Plan       12/21/17 1354    Case Management/Social Work Plan    Plan [facility]    Patient/Family In Agreement With Plan yes    Additional Comments [facility] has offered pt a bed. She has accepted. Notified Dr. Aguilar.               Discharge Codes     None            JV Garcia

## 2017-12-21 NOTE — PROGRESS NOTES
Continued Stay Note  ATTILA Mcgowan     Patient Name: Sophie Ortega  MRN: [id number]  Today's Date: 12/21/2017    Admit Date: 12/15/2017          Discharge Plan       12/21/17 1029    Case Management/Social Work Plan    Plan SNF    Patient/Family In Agreement With Plan yes    Additional Comments Spoke with pt and she is requesting [facility] at d/c. Referral sent to Lisa.               Discharge Codes     None            JV Garcia

## 2017-12-22 NOTE — DISCHARGE INSTRUCTIONS
Dr. Aguilar said pt gets very SOB with any exertion and desats and this is her normal and rebounds back up after a few minutes, just monitor.

## 2017-12-22 NOTE — PLAN OF CARE
Problem: Infection, Risk/Actual (Adult)  Goal: Infection Prevention/Resolution  Outcome: Ongoing (interventions implemented as appropriate)      Problem: Respiratory Insufficiency (Adult)  Goal: Acid/Base Balance  Outcome: Ongoing (interventions implemented as appropriate)    Goal: Effective Ventilation  Outcome: Ongoing (interventions implemented as appropriate)      Problem: Fall Risk (Adult)  Goal: Absence of Falls  Outcome: Ongoing (interventions implemented as appropriate)      Problem: Patient Care Overview (Adult)  Goal: Plan of Care Review  Outcome: Ongoing (interventions implemented as appropriate)   12/21/17 1800   Coping/Psychosocial Response Interventions   Plan Of Care Reviewed With patient   Patient Care Overview   Progress no change   Outcome Evaluation   Outcome Summary/Follow up Plan pt continues on o2 at 5LNC, oxygen desaturation with minimal exertion. Pt denies any pain or discomfort. Remains free from injury. Will continue to monitor.        Problem: Nutrition, Imbalanced: Inadequate Oral Intake (Adult)  Goal: Improved Oral Intake  Outcome: Ongoing (interventions implemented as appropriate)    Goal: Prevent Further Weight Loss  Outcome: Ongoing (interventions implemented as appropriate)

## 2017-12-22 NOTE — PROGRESS NOTES
[facility] Pulmonary Progress Note    Patient: Sophie Ortega  1940   MR# [id number]   [id number]  12/22/17   8:51 AM  Referring Provider: Hal Aguilar DO      Problem list:   Patient Active Problem List   Diagnosis   • COPD exacerbation   • Elevated troponin   • End stage COPD   • Acute on chronic respiratory failure with hypoxia and hypercapnia        Chief Complaint: Dyspnea, hypoxia    Interval history: She currently appears comfortable on oxygen via nasal cannula with O2 saturations in the low 90s.  Shortness of Breath   Associated symptoms include hemoptysis.       Allergy:   Allergies   Allergen Reactions   • Ceftin [Cefuroxime Axetil]    • Codeine Sulfate    • Doxycycline    • Erythromycin    • Levaquin [Levofloxacin] Dizziness       Meds:      budesonide-formoterol 2 puff Inhalation BID - RT   busPIRone 5 mg Oral TID   enoxaparin 30 mg Subcutaneous Q24H   furosemide 40 mg Intravenous Once   furosemide 20 mg Oral BID   guaiFENesin 1,200 mg Oral BID   ipratropium-albuterol 3 mL Nebulization 4x Daily - RT   methylPREDNISolone sodium succinate 20 mg Intravenous Q12H   pantoprazole 40 mg Oral QAM        Review of Systems:   Review of Systems   Respiratory: Positive for hemoptysis and shortness of breath.    Constitutional: Positive for fatigue. Negative for unexpected weight change.   HENT: Negative.    Eyes: Negative.    Respiratory: Positive for cough and shortness of breath.    Cardiovascular: Negative.  Negative for chest pain.   Gastrointestinal: Negative.    Genitourinary: Negative.    Musculoskeletal: Negative.    Skin: Negative.    Neurological: Positive for weakness.    Physical Exam:  Vitals:    12/22/17 0754   BP: 134/61   Pulse: 77   Resp: 20   Temp: 98.1 °F (36.7 °C)   SpO2: 91%       Intake/Output Summary (Last 24 hours) at 12/22/17 3109  Last data filed at 12/22/17 0545   Gross per 24 hour   Intake              480 ml   Output             1850 ml   Net            -1370 ml       Physical  Exam  Constitutional: She is oriented to person, place, and time. She appears well-developed.   Thin and underweight, fragile appearing   HENT: NC O2 in place  Head: Normocephalic and atraumatic.   Some skin blistering and erythema  to right cheek and bridge of nose-healing   Eyes: Pupils are equal, round, and reactive to light. No scleral icterus.   Neck: Normal range of motion. Neck supple.   Cardiovascular: Normal rate and regular rhythm.    Pulmonary/Chest: She has decreased breath sounds (very diminished throughout). She has no wheezes. She has no rales.   Abdominal: Soft. Bowel sounds are normal. She exhibits no distension.   Musculoskeletal: Normal range of motion. No edema.   Neurological: She is alert and oriented to person, place, and time. She is very weak.   Skin: Skin is warm and dry.   Psychiatric: She has a normal mood and affect, she is anxious which is typical for her.   Nursing note and vitals reviewed.    Data:     Results from last 7 days  Lab Units 12/17/17  0543 12/16/17  0037   WBC 10*3/mm3 8.18 10.94*   HEMOGLOBIN g/dL 12.0 13.8   PLATELETS 10*3/mm3 121* 148         Results from last 7 days  Lab Units 12/21/17  0754 12/17/17  0543 12/16/17  0558   SODIUM mmol/L 142 139 138   POTASSIUM mmol/L 4.2 3.9 4.9   BUN mg/dL 37* 28* 18   CREATININE mg/dL 0.78 0.76 0.67         Results from last 7 days  Lab Units 12/17/17  0415 12/16/17  0629 12/16/17  0023   PH, ARTERIAL pH units 7.341* 7.376 7.394   PCO2, ARTERIAL mm Hg 71.1* 56.5* 54.0*   PO2 ART mm Hg 77.9* 74.5* 72.5*   FIO2 %  --  50 70     Radiology:  Imaging Results (last 24 hours)     ** No results found for the last 24 hours. **      2D ECHO:  · Left ventricular systolic function is normal. Estimated EF = 65%.  · Left ventricular diastolic dysfunction (grade I a) consistent with impaired relaxation.  · Mild to moderate tricuspid valve regurgitation is present.  · Severe pulmonary hypertension is present.  · Calculated right ventricular  systolic pressure from tricuspid regurgitation is 90.7 mmHg  · Right ventricular cavity is mildly dilated. RV:LV ratio >1  · RV function is normal        Pulmonary Assessment:    1. Acute on chronic hypercapnic, hypoxemic respiratory failure-back to her baseline which is severely compromised   2. Severe COPD  3. Severe pulmonary hypertension per echo 12-16-17, likely group 3 secondary to her severe lung disease  4. Diastolic dysfunction per echo 12-16-17  5. Emphysema  6. Bronchiectasis  7. Anxiety, improved with Buspar  8. +pseudomonas in sputum 12-19-17, being treated with Augmentin     Plan:     · She should be all right for discharge on oral medications soon.  Pulmonary will sign off.  She can keep office follow-up with Efe Benito as scheduled.    Electronically signed by Kleber Parker MD, 12/22/17, 8:51 AM

## 2017-12-22 NOTE — NURSING NOTE
Report called to [facility] and given to YAMILET Seo without further questions at this time.  This RN gave her my number in case she has questions later. She verbalized understanding.Kimberly Chavez RN

## 2017-12-22 NOTE — PROGRESS NOTES
Continued Stay Note   [place]     Patient Name: Sophie Ortega  MRN: [id number]  Today's Date: 12/22/2017    Admit Date: 12/15/2017          Discharge Plan       12/22/17 1327    Final Note    Final Note REC'D REPORT THAT PT. HAS BEEN D/C'D TO [facility] TODAY; VERIFIED WITH SOHAIL AT [facility] THAT PT. WILL BE ADMITTED TO THE SKILLED LEVEL OF CARE.  PT. TRANSPORTED VIA [facility].                Discharge Codes     None        Expected Discharge Date and Time     Expected Discharge Date Expected Discharge Time    Dec 22, 2017             JV Nobles

## 2017-12-22 NOTE — PLAN OF CARE
Problem: Infection, Risk/Actual (Adult)  Goal: Infection Prevention/Resolution  Outcome: Ongoing (interventions implemented as appropriate)      Problem: Respiratory Insufficiency (Adult)  Goal: Acid/Base Balance  Outcome: Ongoing (interventions implemented as appropriate)    Goal: Effective Ventilation  Outcome: Ongoing (interventions implemented as appropriate)      Problem: Fall Risk (Adult)  Goal: Absence of Falls  Outcome: Ongoing (interventions implemented as appropriate)      Problem: Patient Care Overview (Adult)  Goal: Plan of Care Review  Outcome: Ongoing (interventions implemented as appropriate)   12/22/17 0743   Coping/Psychosocial Response Interventions   Plan Of Care Reviewed With patient   Patient Care Overview   Progress no change   Outcome Evaluation   Outcome Summary/Follow up Plan Patient A &O x 4 without C/O pain this shift. Remains on 5 NC but does desaturate with exertion and talking. Patient has pressure ulcer to coccyx , picture taken and mepliex placed.Safety maintained. VSS       Problem: Pressure Ulcer (Adult)  Goal: Signs and Symptoms of Listed Potential Problems Will be Absent or Manageable (Pressure Ulcer)  Outcome: Ongoing (interventions implemented as appropriate)

## 2017-12-22 NOTE — NURSING NOTE
"Called [facility] to transport patient to [facility].  Zabrina stated \"it may be a little bit, we have had a lot of emergencies.\"  This RN verbalized understanding.Kimberly Chavez RN    "

## 2017-12-22 NOTE — PLAN OF CARE
Problem: Inpatient Physical Therapy  Goal: Bed Mobility Goal LTG- PT  Outcome: Unable to achieve outcome(s) by discharge Date Met: 12/22/17 12/22/17 1557   Bed Mobility PT LTG   Bed Mobility PT LTG, Date Goal Reviewed 12/22/17   Bed Mobility PT LTG, Outcome goal not met   Bed Mobility PT LTG, Reason Goal Not Met discharged from facility     Goal: Transfer Training Goal 1 LTG- PT   12/22/17 1557   Transfer Training PT LTG   Transfer Training PT LTG, Date Goal Reviewed 12/22/17   Transfer Training PT LTG, Outcome goal not met   Transfer Training PT LTG, Reason Goal Not Met discharged from facility     Goal: Gait Training Goal LTG- PT  Outcome: Ongoing (interventions implemented as appropriate)   12/22/17 1557   Gait Training PT LTG   Gait Training Goal PT LTG, Date Goal Reviewed 12/22/17   Gait Training Goal PT LTG, Outcome goal not met   Gait Training Goal PT LTG, Reason Goal Not Met discharged from facility     Goal: Stair Training Goal LTG- PT  Outcome: Unable to achieve outcome(s) by discharge Date Met: 12/22/17 12/22/17 1557   Stair Training PT LTG   Stair Training Goal PT LTG, Date Goal Reviewed 12/22/17   Stair Training Goal PT LTG, Outcome goal not met   Stair Training Goal PT LTG, Reason Goal Not Met discharged from facility

## 2017-12-22 NOTE — NURSING NOTE
[facility] here [place] [facility]Kimberly Chavez RN

## 2017-12-22 NOTE — THERAPY DISCHARGE NOTE
Acute Care - Physical Therapy Treatment Note/Discharge  [facility]     Patient Name: Sophie Ortega  : 1940  MRN: [id number]  Today's Date: 2017  Onset of Illness/Injury or Date of Surgery Date: 12/15/17  Date of Referral to PT: 17  Referring Physician: LAWRENCE Paniagua    Admit Date: 12/15/2017    Visit Dx:    ICD-10-CM ICD-9-CM   1. COPD exacerbation J44.1 491.21   2. Impaired mobility Z74.09 799.89     Patient Active Problem List   Diagnosis   • COPD exacerbation   • Elevated troponin   • End stage COPD   • Acute on chronic respiratory failure with hypoxia and hypercapnia       Physical Therapy Education     Title: PT OT SLP Therapies (Done)     Topic: Physical Therapy (Done)     Point: Mobility training (Done)    Learning Progress Summary    Learner Readiness Method Response Comment Documented by Status   Patient Eager E VU ex's AE 17 1534 Done    Acceptance E NR pursed lip breathing  17 1530 Active    Acceptance E,D DU,VU benefits of PT and POC, call for assist w/ mobility  17 1500 Done               Point: Precautions (Done)    Learning Progress Summary    Learner Readiness Method Response Comment Documented by Status   Patient Acceptance E,D DU,VU benefits of PT and POC, call for assist w/ mobility  17 1500 Done                      User Key     Initials Effective Dates Name Provider Type Discipline    AE 06/22/15 -  Megan Cleaning, PTA Physical Therapy Assistant PT     16 -  Negro Ortega, PT Physical Therapist PT     16 -  Veronica Servin, PTA Physical Therapy Assistant PT                    IP PT Goals       17 1557 17 1501       Bed Mobility PT LTG    Bed Mobility PT LTG, Date Established  17  -     Bed Mobility PT LTG, Time to Achieve  by discharge  -     Bed Mobility PT LTG, Activity Type  all bed mobility  -     Bed Mobility PT LTG, [place] Level  independent  -     Bed Mobility PT LTG, Date  Goal Reviewed 12/22/17  -AE      Bed Mobility PT LTG, Outcome goal not met  -AE      Bed Mobility PT LTG, Reason Goal Not Met discharged from facility  -AE      Transfer Training PT LTG    Transfer Training PT LTG, Date Established  12/19/17  -     Transfer Training PT LTG, Time to Achieve  by discharge  -     Transfer Training PT LTG, Activity Type  bed to chair /chair to bed;sit to stand/stand to sit  -     Transfer Training PT LTG, [place] Level  supervision required  -     Transfer Training PT  LTG, Date Goal Reviewed 12/22/17  -AE      Transfer Training PT LTG, Outcome goal not met  -AE      Transfer Training PT LTG, Reason Goal Not Met discharged from facility  -AE      Gait Training PT LTG    Gait Training Goal PT LTG, Date Established  12/19/17  -     Gait Training Goal PT LTG, Time to Achieve  by discharge  -     Gait Training Goal PT LTG, [place] Level  supervision required  -     Gait Training Goal PT LTG, Distance to Achieve  100  -LH     Gait Training Goal PT LTG, Additional Goal  w/ O2  -LH     Gait Training Goal PT LTG, Date Goal Reviewed 12/22/17  -AE      Gait Training Goal PT LTG, Outcome goal not met  -AE      Gait Training Goal PT LTG, Reason Goal Not Met discharged from facility  -AE      Stair Training PT LTG    Stair Training Goal PT LTG, Date Established  12/19/17  -     Stair Training Goal PT LTG, Time to Achieve  by discharge  -     Stair Training Goal PT LTG, Number of Steps  5  -     Stair Training Goal PT LTG, [place] Level  contact guard assist  -     Stair Training Goal PT LTG, Assist Device  1 handrail  -     Stair Training Goal PT LTG, Date Goal Reviewed 12/22/17  -      Stair Training Goal PT LTG, Outcome goal not met  -AE      Stair Training Goal PT LTG, Reason Goal Not Met discharged from facility  -AE        User Key  (r) = Recorded By, (t) = Taken By, (c) = Cosigned By    Initials Name Provider Type    AE Megan Cleaning, PTA Physical  Therapy Assistant    RON Ortega, PT Physical Therapist              Adult Rehabilitation Note       12/21/17 1512 12/20/17 1459 12/20/17 1100    Rehab Assessment/Intervention    Discipline physical therapy assistant  -AE physical therapy assistant  -KJ physical therapy assistant  -KJ    Document Type therapy note (daily note)  -AE therapy note (daily note)  -KJ     Subjective Information agree to therapy  -AE agree to therapy  -KJ     Patient Effort, Rehab Treatment  adequate  -KJ     Treatment Not Performed   patient/family declined treatment  -KJ    Treatment Not Performed, Comment   refused due to breathing  -KJ    Precautions/Limitations fall precautions;oxygen therapy device and L/min  -AE fall precautions;oxygen therapy device and L/min  -KJ     Recorded by [AE] Megan Cleaning PTA [KJ] Veronica Servin PTA [KJ] Veronica Servin PTA    Vital Signs    Pre SpO2 (%) 90  -AE      O2 Delivery Pre Treatment supplemental O2   5L  -AE      Intra SpO2 (%) 85  -AE      O2 Delivery Intra Treatment supplemental O2   5L  -AE      Post SpO2 (%) 90  -AE      O2 Delivery Post Treatment supplemental O2   5L  -AE      Recorded by [AE] Megan Cleaning PTA      Pain Assessment    Pain Assessment No/denies pain  -AE No/denies pain  -KJ     Recorded by [AE] Megan Cleaning PTA [KJ] Veronica Servin PTA     Bed Mobility, Assessment/Treatment    Bed Mob, Supine to Sit, Universal  verbal cues required;contact guard assist  -KJ     Bed Mob, Sit to Supine, Universal  verbal cues required;supervision required  -KJ     Bed Mobility, Comment --   Pt refused to get up due to fatigue from bath  -AE      Recorded by [AE] Megan Cleaning PTA [KJ] Veronica Servin PTA     Transfer Assessment/Treatment    Transfers, Sit-Stand Universal  verbal cues required;contact guard assist  -KJ     Transfers, Stand-Sit Universal  verbal cues required;contact guard assist  -KJ     Toilet Transfer, Universal  verbal cues required;contact guard  assist;minimum assist (75% patient effort)  -KJ     Toilet Transfer, Assistive Device  rolling walker  -KJ     Recorded by  [KJ] Veronica Servin PTA     Gait Assessment/Treatment    Gait, [place] Level  verbal cues required;contact guard assist  -KJ     Gait, Assistive Device  rolling walker  -KJ     Gait, Distance (Feet)  50   standing rest 50' back to room  -KJ     Gait, Gait Deviations  torres decreased;step length decreased  -KJ     Gait, Safety Issues  supplemental O2  -KJ     Gait, Impairments  strength decreased  -KJ     Gait, Comment  standing rest to recover breath  -KJ     Recorded by  [KJ] Veronica Servin PTA     Therapy Exercises    Bilateral Lower Extremities AROM:;20 reps;ankle pumps/circles;heel slides;SAQ;SLR;hip abduction/adduction;glut sets   RROM heeel slides  -AE AROM:;15 reps  -KJ     Recorded by [AE] Megan Cleaning PTA [KJ] Veronica Servin PTA     Positioning and Restraints    Pre-Treatment Position in bed  -AE in bed  -KJ     Post Treatment Position bed  -AE bed  -KJ     In Bed fowlers;call light within reach  -AE      Recorded by [AE] Megan Cleaning PTA [KJ] Veronica Servin PTA       12/20/17 0953          Rehab Assessment/Intervention    Discipline --  -KJ      Document Type --  -KJ      Subjective Information --  -KJ      Recorded by [KJ] Veronica Servin PTA        User Key  (r) = Recorded By, (t) = Taken By, (c) = Cosigned By    Initials Name Effective Dates    AE Megan Cleaning PTA 06/22/15 -     KJ Veronica Servin PTA 08/02/16 -           PT Recommendation and Plan  Anticipated Discharge Disposition: skilled nursing facility  Planned Therapy Interventions: balance training, bed mobility training, gait training, home exercise program, patient/family education, stair training, strengthening, transfer training  PT Frequency: daily, 2 times/day, per priority policy  Plan of Care Review  Plan Of Care Reviewed With: patient  Progress: no change  Outcome Summary/Follow up Plan: Pt  performed B UE ex's x 10 reps, B LE ex's x 20 reps AROM-RROM. Pt's 02 dropped to 85% during ex's          Outcome Measures       12/21/17 1512          How much help from another person do you currently need...    Turning from your back to your side while in flat bed without using bedrails? 4  -AE      Moving from lying on back to sitting on the side of a flat bed without bedrails? 4  -AE      Moving to and from a bed to a chair (including a wheelchair)? 3  -AE      Standing up from a chair using your arms (e.g., wheelchair, bedside chair)? 4  -AE      Climbing 3-5 steps with a railing? 2  -AE      To walk in hospital room? 3  -AE      AM-PAC 6 Clicks Score 20  -AE      Functional Assessment    Outcome Measure Options AM-PAC 6 Clicks Basic Mobility (PT)  -AE        User Key  (r) = Recorded By, (t) = Taken By, (c) = Cosigned By    Initials Name Provider Type    AE Megan Cleaning PTA Physical Therapy Assistant           Time Calculation:       Therapy Charges for Today     Code Description Service Date Service Provider Modifiers Qty    [id number] HC PT THER PROC EA 15 MIN 12/21/2017 Megan Cleaning PTA GP 2          PT G-Codes  Outcome Measure Options: AM-PAC 6 Clicks Basic Mobility (PT)  Score: 20  Functional Limitation: Mobility: Walking and moving around  Mobility: Walking and Moving Around Current Status (): At least 20 percent but less than 40 percent impaired, limited or restricted  Mobility: Walking and Moving Around Goal Status (): At least 1 percent but less than 20 percent impaired, limited or restricted    PT Discharge Summary  Anticipated Discharge Disposition: skilled nursing facility  Reason for Discharge: Discharge from facility  Outcomes Achieved: Patient able to partially acheive established goals  Discharge Destination: SNF    Megan Cleaning PTA  12/22/2017

## 2017-12-25 PROBLEM — J96.01 ACUTE RESPIRATORY FAILURE WITH HYPOXEMIA (HCC): Status: ACTIVE | Noted: 2017-01-01

## 2018-01-01 NOTE — THERAPY DISCHARGE NOTE
Acute Care - Speech Language Pathology Discharge Summary  [facility]       Patient Name: Sophie Ortega  : 1940  MRN: [id number]    Today's Date: 2018                   Admit Date: 2017      SLP Recommendation and Plan  Pt . Thank you for allowing us to participate in the care of this patient.   Connie Caro CCC-SLP 2018 8:24 AM    Visit Dx:    ICD-10-CM ICD-9-CM   1. Acute respiratory failure with hypoxemia J96.01 518.81   2. Oropharyngeal dysphagia R13.12 787.22                      SLP Goals       18 0822 17 1111       Safely Consume Diet    Safely Consume Diet- SLP, Date Established  17  -KW     Safely Consume Diet- SLP, Time to Achieve  by discharge  -KW     Safely Consume Diet- SLP, Activity Level  Patient will improve oral skills for more efficient eating;Patient will improve timing of pharyngeal response for safer, more efficient swallow;Patient will improve laryngeal elevation for safer, more efficient swallow;Patient will improve tongue base/pharyngeal wall squeeze for safer, more efficient swallow  -KW     Safely Consume Diet- SLP, Additional Goal  Patient will tolerate PO trials of upgrde consistency with SLP with no overt s/s of aspiration.  -KW     Safely Consume Diet- SLP, Date Goal Reviewed  17  -KW     Safely Consume Diet- SLP, Outcome goal not met  -TM goal ongoing  -KW     Safely Consume Diet- SLP, Reason Goal Not Met medical status inhibits participation;other (see comments)   Pt   -TM        User Key  (r) = Recorded By, (t) = Taken By, (c) = Cosigned By    Initials Name Provider Type    TM Connie Caro, CCC-SLP Speech and Language Pathologist    KW Payton Ambrocio, MS CCC-SLP Speech and Language Pathologist                  SLP Discharge Summary  Anticipated Discharge Disposition: other (see comments) (Pt )  Reason for Discharge: Change in medical status  Outcomes Achieved: Unable to tolerate or actively participate in  therapy  Discharge Destination: other (comment) (Pt )      Connie Caro CCC-SLP  2018

## 2021-03-07 NOTE — ED PROVIDER NOTES
Subjective   HPI Comments: Patient was brought in by EMS for a COPD exacerbation.  Patient began having difficulty earlier this evening.  She began to increase her nasal cannula with no improvement.  Normally she runs around 4 L, but there was no improvement.  EMS found her pulse ox to be in the 70s.  She denies any cough.  Prior to arrival patient was given one albuterol treatment by EMS.      History provided by:  Patient and EMS personnel      Review of Systems   Constitutional: Negative for activity change, fatigue and fever.   HENT: Negative for congestion, ear pain, facial swelling, postnasal drip, rhinorrhea, sinus pressure, sore throat and trouble swallowing.    Eyes: Negative for photophobia and redness.   Respiratory: Positive for shortness of breath. Negative for chest tightness and wheezing.    Cardiovascular: Negative for chest pain and palpitations.   Gastrointestinal: Negative for abdominal distention, abdominal pain, diarrhea, nausea and vomiting.   Genitourinary: Negative for difficulty urinating, dysuria and flank pain.   Musculoskeletal: Negative for back pain and neck pain.   Skin: Negative for color change and wound.   Neurological: Negative for dizziness, speech difficulty, weakness, light-headedness and headaches.   Psychiatric/Behavioral: Negative for agitation, confusion, self-injury and suicidal ideas.       Past Medical History:   Diagnosis Date   • Bronchiectasis    • Chronic respiratory failure    • COPD (chronic obstructive pulmonary disease)        Allergies   Allergen Reactions   • Ceftin [Cefuroxime Axetil]    • Codeine Sulfate    • Doxycycline    • Erythromycin    • Levaquin [Levofloxacin] Dizziness       Past Surgical History:   Procedure Laterality Date   • HYSTERECTOMY         History reviewed. No pertinent family history.    Social History     Social History   • Marital status:      Spouse name: N/A   • Number of children: N/A   • Years of education: N/A     Social  · Status post traumatic fall with right inferior pubic rami fracture right pubic symphysis fracture, right anterior acetabular fracture left anterior acetabular fracture  · Seen by Orthopedics conservative management  · Weight-bearing as tolerated  · DVT prophylaxis Lovenox  · Pain control  · PT OT eval for DC planning back to [facility] on Monday 3/8 History Main Topics   • Smoking status: Former Smoker     Types: Cigarettes   • Smokeless tobacco: Never Used   • Alcohol use No   • Drug use: No   • Sexual activity: Defer     Other Topics Concern   • None     Social History Narrative   • None           Objective   Physical Exam   Constitutional: She is oriented to person, place, and time. She appears well-developed and well-nourished. No distress.   HENT:   Head: Normocephalic and atraumatic.   Mouth/Throat: Oropharynx is clear and moist. No oropharyngeal exudate.   Eyes: EOM are normal. Pupils are equal, round, and reactive to light.   Neck: Normal range of motion. Neck supple. No JVD present.   Cardiovascular: Regular rhythm, S1 normal, S2 normal and normal heart sounds.  Tachycardia present.  Exam reveals no gallop and no friction rub.    No murmur heard.  Pulmonary/Chest: She is in respiratory distress. She has decreased breath sounds in the right upper field, the right middle field, the right lower field, the left upper field, the left middle field and the left lower field. She has no wheezes. She has no rhonchi. She has no rales.   Abdominal: Soft. Bowel sounds are normal. She exhibits no distension. There is no tenderness. There is no rebound and no guarding.   Neurological: She is alert and oriented to person, place, and time. No cranial nerve deficit.   Skin: Skin is warm and dry. No rash noted.   Psychiatric: She has a normal mood and affect. Her behavior is normal. Judgment and thought content normal.   Nursing note and vitals reviewed.      Procedures         ED Course  ED Course   Value Comment By Time   ECG 12 Lead Sinus tach with a rate of 117 normal axis, no acute ST elevations or depressions.  Frequent PVCs.  A poor quality twelve-lead. Neo Clark MD 12/16 0118      Lab Results (last 24 hours)     Procedure Component Value Units Date/Time    Blood Gas, Arterial [[id number]]  (Abnormal) Collected:  12/16/17 0023    Specimen:  Arterial Blood  Updated:  12/16/17 0031     Site Right Radial     Meet's Test Positive     pH, Arterial 7.394 pH units      pCO2, Arterial 54.0 (H) mm Hg      pO2, Arterial 72.5 (L) mm Hg      HCO3, Arterial 33.0 (H) mmol/L      Base Excess, Arterial 6.4 (H) mmol/L      O2 Saturation, Arterial 94.5 %      Temperature 37.0 C      Barometric Pressure for Blood Gas 755 mmHg      Modality BiPap     FIO2 70 %      Ventilator Mode NA     Set Mech Resp Rate 14.0     IPAP 12     EPAP 6     Collected by [id number]    CBC & Differential [[id number]] Collected:  12/16/17 0037    Specimen:  Blood Updated:  12/16/17 0054    Narrative:       The following orders were created for panel order CBC & Differential.  Procedure                               Abnormality         Status                     ---------                               -----------         ------                     CBC Auto Differential[[id number]]        Abnormal            Final result                 Please view results for these tests on the individual orders.    Lactic Acid, Plasma [[id number]]  (Normal) Collected:  12/16/17 0037    Specimen:  Blood Updated:  12/16/17 0109     Lactate 1.4 mmol/L     CBC Auto Differential [[id number]]  (Abnormal) Collected:  12/16/17 0037    Specimen:  Blood Updated:  12/16/17 0054     WBC 10.94 (H) 10*3/mm3      RBC 4.35 10*6/mm3      Hemoglobin 13.8 g/dL      Hematocrit 42.0 %      MCV 96.6 fL      MCH 31.7 pg      MCHC 32.9 (L) g/dL      RDW 14.5 %      RDW-SD 51.5 fl      MPV 10.7 fL      Platelets 148 10*3/mm3      Neutrophil % 89.6 (H) %      Lymphocyte % 2.7 (L) %      Monocyte % 6.7 %      Eosinophil % 0.2 %      Basophil % 0.4 %      Immature Grans % 0.4 %      Neutrophils, Absolute 9.81 (H) 10*3/mm3      Lymphocytes, Absolute 0.30 (L) 10*3/mm3      Monocytes, Absolute 0.73 10*3/mm3      Eosinophils, Absolute 0.02 10*3/mm3      Basophils, Absolute 0.04 10*3/mm3      Immature Grans, Absolute 0.04 (H) 10*3/mm3      nRBC 0.0 /100 WBC     Blood  Culture With TIMOTHY - Blood, [[id number]] Collected:  12/16/17 0037    Specimen:  Blood from Arm, Right Updated:  12/16/17 0105    Protime-INR [[id number]]  (Normal) Collected:  12/16/17 0038    Specimen:  Blood Updated:  12/16/17 0102     Protime 13.9 Seconds      INR 1.04    aPTT [[id number]]  (Normal) Collected:  12/16/17 0038    Specimen:  Blood Updated:  12/16/17 0102     PTT 29.5 seconds     CK-MB [[id number]]  (Normal) Collected:  12/16/17 0159    Specimen:  Blood Updated:  12/16/17 0235     CKMB 1.27 ng/mL     Narrative:       CKMB Index not indicated    Comprehensive Metabolic Panel [[id number]]  (Abnormal) Collected:  12/16/17 0159    Specimen:  Blood Updated:  12/16/17 0218     Glucose 123 (H) mg/dL      BUN 17 mg/dL      Creatinine 0.66 mg/dL      Sodium 139 mmol/L      Potassium 4.7 mmol/L      Chloride 96 (L) mmol/L      CO2 35.0 (H) mmol/L      Calcium 9.3 mg/dL      Total Protein 6.7 g/dL      Albumin 3.90 g/dL      ALT (SGPT) 41 U/L      AST (SGOT) 24 U/L      Alkaline Phosphatase 78 U/L      Total Bilirubin 0.9 mg/dL      eGFR Non African Amer 86 mL/min/1.73      Globulin 2.8 gm/dL      A/G Ratio 1.4 g/dL      BUN/Creatinine Ratio 25.8 (H)     Anion Gap 8.0 mmol/L     Narrative:       The MDRD GFR formula is only valid for adults with stable renal function between ages 18 and 70.    Troponin [[id number]]  (Abnormal) Collected:  12/16/17 0159    Specimen:  Blood Updated:  12/16/17 0235     Troponin I 0.048 (H) ng/mL     Blood Culture With TIMOTHY - Blood, [[id number]] Collected:  12/16/17 0159    Specimen:  Blood from Hand, Left Updated:  12/16/17 0209                      MDM  Number of Diagnoses or Management Options  COPD exacerbation: new and requires workup  Diagnosis management comments: Following BiPAP patient breathing had improved.  Will admit patient to ICU for further evaluation.       Amount and/or Complexity of Data Reviewed  Clinical lab tests: ordered and reviewed  Tests in the radiology section of  CPT®: ordered and reviewed  Tests in the medicine section of CPT®: ordered and reviewed  Decide to obtain previous medical records or to obtain history from someone other than the patient: yes  Discuss the patient with other providers: yes  Independent visualization of images, tracings, or specimens: yes    Risk of Complications, Morbidity, and/or Mortality  Presenting problems: high  Diagnostic procedures: moderate  Management options: moderate    Patient Progress  Patient progress: stable      Final diagnoses:   COPD exacerbation            Neo Clark MD  12/16/17 0356

## 2021-07-26 NOTE — PROGRESS NOTES
"    [facility] Medicine Services  INPATIENT PROGRESS NOTE    Length of Stay: 0  Date of Admission: 12/15/2017  Primary Care Physician: No Known Provider    Subjective     Chief Complaint:     Shortness of breath    HPI     The patient has been transitioned successfully from BiPAP to 5 L nasal cannula.  She typically uses 4 L per nasal cannula at home and saturates in the upper to low 90s.  Currently she is saturating in the low to mid 90s.  She has refused CT scan of the chest and does not want to return to BiPAP.  Chest x-ray shows significant volume loss however that is a chronic finding.  The patient has also refused repeat troponins and says she \"knows it's not her heart\".     Review of Systems   Constitutional: Positive for activity change.   HENT: Negative.    Eyes: Negative.    Respiratory: Positive for shortness of breath.    Cardiovascular: Negative.    Gastrointestinal: Negative.    Endocrine: Negative.    Genitourinary: Negative.    Musculoskeletal: Negative.    Skin: Negative.    Allergic/Immunologic: Negative.    Neurological: Negative.    Hematological: Negative.    Psychiatric/Behavioral: Negative.         All pertinent negatives and positives are as above. All other systems have been reviewed and are negative unless otherwise stated.     Objective    Temp:  [97.9 °F (36.6 °C)-100.4 °F (38 °C)] 97.9 °F (36.6 °C)  Heart Rate:  [] 85  Resp:  [20-30] 24  BP: (107-145)/(60-98) 109/61    Lab Results (last 24 hours)     Procedure Component Value Units Date/Time    Blood Gas, Arterial [[id number]]  (Abnormal) Collected:  12/16/17 0023    Specimen:  Arterial Blood Updated:  12/16/17 0031     Site Right Radial     Meet's Test Positive     pH, Arterial 7.394 pH units      pCO2, Arterial 54.0 (H) mm Hg      pO2, Arterial 72.5 (L) mm Hg      HCO3, Arterial 33.0 (H) mmol/L      Base Excess, Arterial 6.4 (H) mmol/L      O2 Saturation, Arterial 94.5 %      Temperature 37.0 C  "     Barometric Pressure for Blood Gas 755 mmHg      Modality BiPap     FIO2 70 %      Ventilator Mode NA     Set [facility] Resp Rate 14.0     IPAP 12     EPAP 6     Collected by [id number]    CBC Auto Differential [[id number]]  (Abnormal) Collected:  12/16/17 0037    Specimen:  Blood Updated:  12/16/17 0054     WBC 10.94 (H) 10*3/mm3      RBC 4.35 10*6/mm3      Hemoglobin 13.8 g/dL      Hematocrit 42.0 %      MCV 96.6 fL      MCH 31.7 pg      MCHC 32.9 (L) g/dL      RDW 14.5 %      RDW-SD 51.5 fl      MPV 10.7 fL      Platelets 148 10*3/mm3      Neutrophil % 89.6 (H) %      Lymphocyte % 2.7 (L) %      Monocyte % 6.7 %      Eosinophil % 0.2 %      Basophil % 0.4 %      Immature Grans % 0.4 %      Neutrophils, Absolute 9.81 (H) 10*3/mm3      Lymphocytes, Absolute 0.30 (L) 10*3/mm3      Monocytes, Absolute 0.73 10*3/mm3      Eosinophils, Absolute 0.02 10*3/mm3      Basophils, Absolute 0.04 10*3/mm3      Immature Grans, Absolute 0.04 (H) 10*3/mm3      nRBC 0.0 /100 WBC     Protime-INR [[id number]]  (Normal) Collected:  12/16/17 0038    Specimen:  Blood Updated:  12/16/17 0102     Protime 13.9 Seconds      INR 1.04    aPTT [[id number]]  (Normal) Collected:  12/16/17 0038    Specimen:  Blood Updated:  12/16/17 0102     PTT 29.5 seconds     Blood Culture With TIMOTHY - Blood, [[id number]] Collected:  12/16/17 0037    Specimen:  Blood from Arm, Right Updated:  12/16/17 0105    Lactic Acid, Plasma [[id number]]  (Normal) Collected:  12/16/17 0037    Specimen:  Blood Updated:  12/16/17 0109     Lactate 1.4 mmol/L     Blood Culture With TIMOTHY - Blood, [[id number]] Collected:  12/16/17 0159    Specimen:  Blood from Hand, Left Updated:  12/16/17 0209    Comprehensive Metabolic Panel [[id number]]  (Abnormal) Collected:  12/16/17 0159    Specimen:  Blood Updated:  12/16/17 0218     Glucose 123 (H) mg/dL      BUN 17 mg/dL      Creatinine 0.66 mg/dL      Sodium 139 mmol/L      Potassium 4.7 mmol/L      Chloride 96 (L) mmol/L      CO2 35.0 (H) mmol/L       Calcium 9.3 mg/dL      Total Protein 6.7 g/dL      Albumin 3.90 g/dL      ALT (SGPT) 41 U/L      AST (SGOT) 24 U/L      Alkaline Phosphatase 78 U/L      Total Bilirubin 0.9 mg/dL      eGFR Non African Amer 86 mL/min/1.73      Globulin 2.8 gm/dL      A/G Ratio 1.4 g/dL      BUN/Creatinine Ratio 25.8 (H)     Anion Gap 8.0 mmol/L     Narrative:       The MDRD GFR formula is only valid for adults with stable renal function between ages 18 and 70.    CK-MB [[id number]]  (Normal) Collected:  12/16/17 0159    Specimen:  Blood Updated:  12/16/17 0235     CKMB 1.27 ng/mL     Narrative:       CKMB Index not indicated    Troponin [[id number]]  (Abnormal) Collected:  12/16/17 0159    Specimen:  Blood Updated:  12/16/17 0235     Troponin I 0.048 (H) ng/mL     BNP [[id number]]  (Abnormal) Collected:  12/16/17 0159    Specimen:  Blood Updated:  12/16/17 0512     proBNP 3880.0 (H) pg/mL     Basic Metabolic Panel [[id number]]  (Abnormal) Collected:  12/16/17 0558    Specimen:  Blood Updated:  12/16/17 0636     Glucose 145 (H) mg/dL      BUN 18 mg/dL      Creatinine 0.67 mg/dL      Sodium 138 mmol/L      Potassium 4.9 mmol/L      Chloride 94 (L) mmol/L      CO2 36.0 (H) mmol/L      Calcium 9.5 mg/dL      eGFR Non African Amer 85 mL/min/1.73      BUN/Creatinine Ratio 26.9 (H)     Anion Gap 8.0 mmol/L     Narrative:       The MDRD GFR formula is only valid for adults with stable renal function between ages 18 and 70.    Blood Gas, Arterial [[id number]]  (Abnormal) Collected:  12/16/17 0629    Specimen:  Arterial Blood Updated:  12/16/17 0636     Site Right Radial     Meet's Test Positive     pH, Arterial 7.376 pH units      pCO2, Arterial 56.5 (H) mm Hg      pO2, Arterial 74.5 (L) mm Hg      HCO3, Arterial 33.1 (H) mmol/L      Base Excess, Arterial 6.1 (H) mmol/L      O2 Saturation, Arterial 94.8 %      Temperature 37.0 C      Barometric Pressure for Blood Gas 754 mmHg      Modality BiPap     FIO2 50 %      Ventilator Mode NA     Set [facility]  Resp Rate 14.0     IPAP 12     EPAP 6     Collected by [id number]    Troponin [[id number]]  (Abnormal) Collected:  12/16/17 0558    Specimen:  Blood Updated:  12/16/17 0648     Troponin I 0.067 (H) ng/mL           Imaging Results (last 24 hours)     Procedure Component Value Units Date/Time    XR Chest 1 View [[id number]] Collected:  12/16/17 0755     Updated:  12/16/17 0758    Narrative:       XR CHEST 1 VW- 12/16/2017 12:01 AM CST     HISTORY: SOB       COMPARISON: 09/23/2016.     FINDINGS:   There is moderate volume loss in the RIGHT lung. The patient is rotated  on today's exam. No acute interval change is seen in the lungs since the  previous study. The cardiomediastinal silhouette and pulmonary  vascularity are unchanged.      The osseous structures and surrounding soft tissues demonstrate no acute  abnormality.       Impression:       1. Emphysematous changes and chronic volume loss in the RIGHT lung.  2. No acute interval change since 09/23/2016.        This report was finalized on 12/16/2017 07:55 by Dr. Henrry Eckert MD.           Intake/Output Summary (Last 24 hours) at 12/16/17 1102  Last data filed at 12/16/17 0800   Gross per 24 hour   Intake                0 ml   Output              625 ml   Net             -625 ml     Physical Exam   Constitutional: She is oriented to person, place, and time. Vital signs are normal. She appears cachectic. She is cooperative. Nasal cannula in place.   HENT:   Head: Normocephalic and atraumatic.   Nose: Nose normal.   Mouth/Throat: Oropharynx is clear and moist.   Eyes: Conjunctivae are normal. No scleral icterus.   Neck: Normal range of motion. Neck supple. No JVD present. No tracheal deviation present. No thyromegaly present.   Cardiovascular: Normal rate, regular rhythm and normal heart sounds.    No murmur heard.  Pulmonary/Chest: Tachypnea noted. She is in respiratory distress (chronic). She has decreased breath sounds (throughout). She has no wheezes.   Abdominal:  Soft. Bowel sounds are normal. She exhibits no distension. There is no tenderness.   Musculoskeletal: Normal range of motion. She exhibits edema (1/4 bilat feet). She exhibits no tenderness.   Neurological: She is alert and oriented to person, place, and time. She has normal reflexes. No cranial nerve deficit. Coordination normal.   Skin: Skin is warm and dry.   Psychiatric: She has a normal mood and affect. Her behavior is normal. Judgment and thought content normal.     Results Review:  I have reviewed the labs, radiology results, and diagnostic studies since my last progress note and made treatment changes reflective of the results.   I have reviewed the current medications.    Assessment/Plan     Hospital Problem List     * (Principal)Acute on chronic respiratory failure with hypoxia and hypercapnia    COPD exacerbation    End stage COPD    Elevated troponin          PLAN:  Transfer to the medical surgical floor  De-escalate antibiotics soon  Continue aggressive respiratory treatment. Lasix (likely pulm htn)    Cristian Butler DO   12/16/17   11:02 AM     [id number] [facility]

## 2024-08-28 NOTE — DISCHARGE SUMMARY
[facility] Medicine Services  DISCHARGE SUMMARY       Date of Admission: 12/15/2017  Date of Discharge:  12/22/2017  Primary Care Physician: LAWRENCE Castillo    Presenting Problem/History of Present Illness:  Shortness of breath.     Final Discharge Diagnoses:  1.  Acute on chronic hypoxic and hypercarbic respiratory failure.  2.  End-stage chronic obstructive pulmonary disease with acute exacerbation.  3.  Severe pulmonary hypertension with chronic cor pulmonale.  4.  Diastolic dysfunction.  5.  Bronchiectasis.  6.  Sputum culture positive for Pseudomonas, but no clear pneumonic process; consider colonization.  7.  Generalized anxiety disorder.  8.  History of tobacco abuse, currently in remission.  9.  Steroid-induced hyperglycemia.    Consults: Dr. Parker with pulmonology.     Procedures Performed: None.     Pertinent Test Results:   Imaging Results (last 7 days)     Procedure Component Value Units Date/Time    XR Chest 1 View [[id number]] Collected:  12/16/17 0755     Updated:  12/16/17 0758    Narrative:       XR CHEST 1 VW- 12/16/2017 12:01 AM CST     HISTORY: SOB       COMPARISON: 09/23/2016.     FINDINGS:   There is moderate volume loss in the RIGHT lung. The patient is rotated  on today's exam. No acute interval change is seen in the lungs since the  previous study. The cardiomediastinal silhouette and pulmonary  vascularity are unchanged.      The osseous structures and surrounding soft tissues demonstrate no acute  abnormality.       Impression:       1. Emphysematous changes and chronic volume loss in the RIGHT lung.  2. No acute interval change since 09/23/2016.        This report was finalized on 12/16/2017 07:55 by Dr. Henrry Eckert MD.        Lab Results (all)     Procedure Component Value Units Date/Time    Blood Gas, Arterial [[id number]]  (Abnormal) Collected:  12/16/17 0023    Specimen:  Arterial Blood Updated:  12/16/17 0031     Site Right Radial      Meet's Test Positive     pH, Arterial 7.394 pH units      pCO2, Arterial 54.0 (H) mm Hg      pO2, Arterial 72.5 (L) mm Hg      HCO3, Arterial 33.0 (H) mmol/L      Base Excess, Arterial 6.4 (H) mmol/L      O2 Saturation, Arterial 94.5 %      Temperature 37.0 C      Barometric Pressure for Blood Gas 755 mmHg      Modality BiPap     FIO2 70 %      Ventilator Mode NA     Set Mech Resp Rate 14.0     IPAP 12     EPAP 6     Collected by [id number]    CBC Auto Differential [[id number]]  (Abnormal) Collected:  12/16/17 0037    Specimen:  Blood Updated:  12/16/17 0054     WBC 10.94 (H) 10*3/mm3      RBC 4.35 10*6/mm3      Hemoglobin 13.8 g/dL      Hematocrit 42.0 %      MCV 96.6 fL      MCH 31.7 pg      MCHC 32.9 (L) g/dL      RDW 14.5 %      RDW-SD 51.5 fl      MPV 10.7 fL      Platelets 148 10*3/mm3      Neutrophil % 89.6 (H) %      Lymphocyte % 2.7 (L) %      Monocyte % 6.7 %      Eosinophil % 0.2 %      Basophil % 0.4 %      Immature Grans % 0.4 %      Neutrophils, Absolute 9.81 (H) 10*3/mm3      Lymphocytes, Absolute 0.30 (L) 10*3/mm3      Monocytes, Absolute 0.73 10*3/mm3      Eosinophils, Absolute 0.02 10*3/mm3      Basophils, Absolute 0.04 10*3/mm3      Immature Grans, Absolute 0.04 (H) 10*3/mm3      nRBC 0.0 /100 WBC     Protime-INR [[id number]]  (Normal) Collected:  12/16/17 0038    Specimen:  Blood Updated:  12/16/17 0102     Protime 13.9 Seconds      INR 1.04    aPTT [[id number]]  (Normal) Collected:  12/16/17 0038    Specimen:  Blood Updated:  12/16/17 0102     PTT 29.5 seconds     Lactic Acid, Plasma [[id number]]  (Normal) Collected:  12/16/17 0037    Specimen:  Blood Updated:  12/16/17 0109     Lactate 1.4 mmol/L     Comprehensive Metabolic Panel [[id number]]  (Abnormal) Collected:  12/16/17 0159    Specimen:  Blood Updated:  12/16/17 0218     Glucose 123 (H) mg/dL      BUN 17 mg/dL      Creatinine 0.66 mg/dL      Sodium 139 mmol/L      Potassium 4.7 mmol/L      Chloride 96 (L) mmol/L      CO2 35.0 (H) mmol/L       [de-identified] : Recommend initial course of outpatient physical therapy and anti-inflammatory usage as necessary. Patient is tolerating anti-inflammatories without any reported difficulty. He will continue to use medication OTC as tolerated. Physical therapy to focus on poor muscle strengthening range of motion and Quadratus lumborum stretching.  Reevaluate patient in 4 to 6 weeks. Can consider advanced imaging with MRI and possible pain management referral for injections if he is refractory to PT alone. He will follow up with primary care for renal cyst and prostate hypertrophy as well as ED.  Prior to appointment and during encounter with patient extensive medical records were reviewed including but not limited to, hospital records, outpatient records, imaging results, and lab data. During this appointment the patient was examined, diagnoses were discussed and explained in a face to face manner. In addition extensive time was spent reviewing aforementioned diagnostic studies. Counseling including abnormal image results, differential diagnoses, treatment options, risk and benefits, lifestyle changes, current condition, and current medications was performed. Patient's comments, questions, and concerns were addressed and patient verbalized understanding. Based on this patient's presentation at our office, which is an orthopedic spine surgeon's office, this patient inherently / intrinsically has a risk, however minute, of developing issues such as Cauda equina syndrome, bowel and bladder changes, or progression of motor or neurological deficits such as paralysis which may be permanent.   I, [BERONICA Gomez], certify that the clinical information was reviewed with Dr. Walker, who was physically present in the office. He agreed with the above plan of care and was available for consultation as necessary during the office visit.  Calcium 9.3 mg/dL      Total Protein 6.7 g/dL      Albumin 3.90 g/dL      ALT (SGPT) 41 U/L      AST (SGOT) 24 U/L      Alkaline Phosphatase 78 U/L      Total Bilirubin 0.9 mg/dL      eGFR Non African Amer 86 mL/min/1.73      Globulin 2.8 gm/dL      A/G Ratio 1.4 g/dL      BUN/Creatinine Ratio 25.8 (H)     Anion Gap 8.0 mmol/L     Narrative:       The MDRD GFR formula is only valid for adults with stable renal function between ages 18 and 70.    CK-MB [[id number]]  (Normal) Collected:  12/16/17 0159    Specimen:  Blood Updated:  12/16/17 0235     CKMB 1.27 ng/mL     Narrative:       CKMB Index not indicated    Troponin [[id number]]  (Abnormal) Collected:  12/16/17 0159    Specimen:  Blood Updated:  12/16/17 0235     Troponin I 0.048 (H) ng/mL     BNP [[id number]]  (Abnormal) Collected:  12/16/17 0159    Specimen:  Blood Updated:  12/16/17 0512     proBNP 3880.0 (H) pg/mL     Basic Metabolic Panel [[id number]]  (Abnormal) Collected:  12/16/17 0558    Specimen:  Blood Updated:  12/16/17 0636     Glucose 145 (H) mg/dL      BUN 18 mg/dL      Creatinine 0.67 mg/dL      Sodium 138 mmol/L      Potassium 4.9 mmol/L      Chloride 94 (L) mmol/L      CO2 36.0 (H) mmol/L      Calcium 9.5 mg/dL      eGFR Non African Amer 85 mL/min/1.73      BUN/Creatinine Ratio 26.9 (H)     Anion Gap 8.0 mmol/L     Narrative:       The MDRD GFR formula is only valid for adults with stable renal function between ages 18 and 70.    Blood Gas, Arterial [[id number]]  (Abnormal) Collected:  12/16/17 0629    Specimen:  Arterial Blood Updated:  12/16/17 0636     Site Right Radial     Meet's Test Positive     pH, Arterial 7.376 pH units      pCO2, Arterial 56.5 (H) mm Hg      pO2, Arterial 74.5 (L) mm Hg      HCO3, Arterial 33.1 (H) mmol/L      Base Excess, Arterial 6.1 (H) mmol/L      O2 Saturation, Arterial 94.8 %      Temperature 37.0 C      Barometric Pressure for Blood Gas 754 mmHg      Modality BiPap     FIO2 50 %      Ventilator Mode NA     Set [facility]  Resp Rate 14.0     IPAP 12     EPAP 6     Collected by [id number]    Troponin [[id number]]  (Abnormal) Collected:  12/16/17 0558    Specimen:  Blood Updated:  12/16/17 0648     Troponin I 0.067 (H) ng/mL     Blood Gas, Arterial [[id number]]  (Abnormal) Collected:  12/17/17 0415    Specimen:  Arterial Blood Updated:  12/17/17 0429     Site Right Radial     Meet's Test Positive     pH, Arterial 7.341 (L) pH units      pCO2, Arterial 71.1 (C) mm Hg      pO2, Arterial 77.9 (L) mm Hg      HCO3, Arterial 38.4 (H) mmol/L      Base Excess, Arterial 10.1 (H) mmol/L      O2 Saturation, Arterial 95.8 %      Temperature 37.0 C      Barometric Pressure for Blood Gas 754 mmHg      Modality Nasal Cannula     Flow Rate 4.5 lpm      Ventilator Mode NA     Notified Who ZIA Suarez [id number]     Notified By [id number]     Notified Time 12/17/2017 04:30     Collected by [id number]    BNP [[id number]]  (Abnormal) Collected:  12/17/17 0543    Specimen:  Blood Updated:  12/17/17 0618     proBNP 4670.0 (H) pg/mL     CBC Auto Differential [[id number]]  (Abnormal) Collected:  12/17/17 0543    Specimen:  Blood Updated:  12/17/17 0625     WBC 8.18 10*3/mm3      RBC 3.80 (L) 10*6/mm3      Hemoglobin 12.0 g/dL      Hematocrit 37.1 %      MCV 97.6 fL      MCH 31.6 pg      MCHC 32.3 (L) g/dL      RDW 14.5 %      RDW-SD 51.9 fl      MPV 11.0 fL      Platelets 121 (L) 10*3/mm3      Neutrophil % 94.5 (H) %      Lymphocyte % 2.2 (L) %      Monocyte % 2.6 (L) %      Eosinophil % 0.0 %      Basophil % 0.1 %      Immature Grans % 0.6 %      Neutrophils, Absolute 7.73 10*3/mm3      Lymphocytes, Absolute 0.18 (L) 10*3/mm3      Monocytes, Absolute 0.21 10*3/mm3      Eosinophils, Absolute 0.00 10*3/mm3      Basophils, Absolute 0.01 10*3/mm3      Immature Grans, Absolute 0.05 (H) 10*3/mm3      nRBC 0.0 /100 WBC     Comprehensive Metabolic Panel [[id number]]  (Abnormal) Collected:  12/17/17 0543    Specimen:  Blood Updated:  12/17/17 0635     Glucose 177 (H) mg/dL      BUN 28  (H) mg/dL      Creatinine 0.76 mg/dL      Sodium 139 mmol/L      Potassium 3.9 mmol/L      Chloride 93 (L) mmol/L      CO2 39.0 (H) mmol/L      Calcium 8.7 mg/dL      Total Protein 6.2 (L) g/dL      Albumin 3.50 g/dL      ALT (SGPT) 38 U/L      AST (SGOT) 24 U/L      Alkaline Phosphatase 55 U/L      Total Bilirubin 0.5 mg/dL      eGFR Non African Amer 73 mL/min/1.73      Globulin 2.7 gm/dL      A/G Ratio 1.3 g/dL      BUN/Creatinine Ratio 36.8 (H)     Anion Gap 7.0 mmol/L     Narrative:       The MDRD GFR formula is only valid for adults with stable renal function between ages 18 and 70.    Respiratory Culture - Sputum, Cough [[id number]]  (Abnormal)  (Susceptibility) Collected:  12/16/17 1153    Specimen:  Sputum from Cough Updated:  12/19/17 0712     Respiratory Culture --      Light growth (2+) Normal Respiratory Chato      Moderate growth (3+) Pseudomonas aeruginosa (A)     Gram Stain Result Greater than 25 WBCs per low power field      Few (2+) Mixed gram positive chato      No epithelial cells seen    Susceptibility      Pseudomonas aeruginosa     PEPITO     Cefepime <=1 ug/ml Susceptible     Ceftazidime 4 ug/ml Susceptible     Gentamicin 4 ug/ml Susceptible     Levofloxacin 0.25 ug/ml Susceptible     Meropenem <=0.25 ug/ml Susceptible     Piperacillin + Tazobactam 8 ug/ml Susceptible                    Blood Culture With TIMOTHY - Blood, [[id number]]  (Normal) Collected:  12/16/17 0037    Specimen:  Blood from Arm, Right Updated:  12/21/17 0116     Blood Culture No growth at 5 days    Blood Culture With TIMOTHY - Blood, [[id number]]  (Normal) Collected:  12/16/17 0159    Specimen:  Blood from Hand, Left Updated:  12/21/17 0216     Blood Culture No growth at 5 days    Basic Metabolic Panel [[id number]]  (Abnormal) Collected:  12/21/17 0754    Specimen:  Blood Updated:  12/21/17 0856     Glucose 154 (H) mg/dL      BUN 37 (H) mg/dL      Creatinine 0.78 mg/dL      Sodium 142 mmol/L      Potassium 4.2 mmol/L      Chloride 85 (L)  mmol/L      CO2 >40.0 (C) mmol/L      Calcium 8.7 mg/dL      eGFR Non African Amer 71 mL/min/1.73      BUN/Creatinine Ratio 47.4 (H)     Anion Gap -- mmol/L       Unable to calculate Anion Gap.       Narrative:       The MDRD GFR formula is only valid for adults with stable renal function between ages 18 and 70.        Hospital Course:  The patient is a 77 y.o. female who presented to [facility] with shortness of breath. She has a long-standing history of end-stage chronic obstructive pulmonary disease and is chronically oxygen dependent at 5 L.  She was felt to have a COPD exacerbation without pneumonia.  She was admitted to Dr. Butler and Dr. Parker was consulted.  She was followed chronically and Dr. Noriega's clinic prior to his [facility] and she now sees LAWRENCE Paniagua in their clinic.    Continue inhaled bronchodilators and Symbicort (on Advair and Incruse at home).  Continue Mucinex.  Incentive spirometry as able.      Steroids have been weaned over time and she can now be on oral prednisone. She completed a course of oral Augmentin. She has pan-susceptible Pseudomonas on her sputum culture.  She did not have a pneumonia on her chest x-ray.  She has run no fever.  It is quite possible that this is colonization given her history of end-stage lung disease and bronchiectasis. We did not use IV antibiotics for this at this point.      The pulmonary service started her on oral Lasix for her severe pulmonary hypertension and diastolic dysfunction. The patient allowed me to check a BMP again on 12/21 that showed normal renal function and potassium.       Continue BuSpar for anxiety, which she has done well with.       Lovenox was used for DVT prophylaxis.      PT followed.     The patient is chronically debilitated and weak.  At this point in time she has end-stage COPD and has extremely poor stamina.  We discussed all this with she and her son.  The patient has agreed to go to  "skilled nursing care for now.  It should be known to the [facility] nursing facility that the patient has frequent episodes of desaturation and dyspnea on exertion with limited activity.  She also experiences tachycardia at time with this.  There is absolutely nothing that we can do to fix this other than try to increase her stamina and continue intense medical therapy.  This is going to happen at the skilled nursing facility.  It should be expected.  It usually takes her up to 10-15 minutes to completely recover from these episodes.  She has been on 5 L nasal cannula, which is what she uses at home.  She typically holds rest saturations around 88-90%.    The patient desires no intense intervention in her medical care. She is a DO NOT RESUSCITATE with aggressive care.  It is her desire to gain some strength and stamina at rehabilitation and ultimately return home.    Physical Exam on Discharge:  /61 (BP Location: Right arm, Patient Position: Lying)  Pulse 77  Temp 98.1 °F (36.7 °C) (Temporal Artery )   Resp 20  Ht 162.6 cm (64\")  Wt 45.9 kg (101 lb 3.2 oz)  SpO2 91%  BMI 17.37 kg/m2  Physical Exam  Constitutional: She is oriented to person, place, and time. No distress.   Up in bed, chronically ill appearing. Frail. No family here at present. Discussed with her nurse, Kimberly.     Head: Normocephalic and atraumatic.   Eyes: Conjunctivae and EOM are normal. Pupils are equal, round, and reactive to light.   Neck: Neck supple. No JVD present.   Cardiovascular: Normal rate, regular rhythm, normal heart sounds and intact distal pulses.    Pulmonary/Chest: Effort normal and breath sounds normal. No respiratory distress. She has no wheezes. She has no rales. She exhibits no tenderness. Diminished at the bases, but clear.   Abdominal: Soft. Bowel sounds are normal. She exhibits no distension. There is no tenderness.   Musculoskeletal: Normal range of motion. She exhibits no edema, tenderness or deformity. "   Neurological: She is alert and oriented to person, place, and time. She displays normal reflexes. No cranial nerve deficit. She exhibits normal muscle tone. Significant generalized weakness without focality.   Skin: Skin is warm and dry. No rash noted.   Psychiatric: She has a normal mood and affect. Her behavior is normal. Judgment and thought content normal.     Condition on Discharge: Stable for now.     Discharge Disposition:  [facility].     Discharge Medications:   Love Ortegaerin STORM   Home Medication Instructions JAMES:[id number]    Printed on:12/22/17 2042   Medication Information                      albuterol (PROVENTIL) (2.5 MG/3ML) 0.083% nebulizer solution  Take 2.5 mg by nebulization Every 4 (Four) Hours As Needed for Wheezing.             busPIRone (BUSPAR) 5 MG tablet  Take 5 mg by mouth 3 (Three) Times a Day.             fluticasone-salmeterol (ADVAIR) 100-50 MCG/DOSE DISKUS  Inhale 2 (Two) Times a Day.             furosemide (LASIX) 20 MG tablet  Take 1 tablet by mouth 2 (Two) Times a Day.             guaiFENesin (MUCINEX) 600 MG 12 hr tablet  Take 1,200 mg by mouth 2 (Two) Times a Day.             omeprazole (priLOSEC) 20 MG capsule  Take 20 mg by mouth Daily.             predniSONE (DELTASONE) 10 MG tablet  Take 4 tablets daily for 3 days, then 2 tablets daily for 3 days, then 1 tablet daily for 3 days, then stop.             Umeclidinium Bromide (INCRUSE ELLIPTA IN)  Inhale.               Discharge Diet:   Diet Instructions     Diet: Regular; Thin       Discharge Diet:  Regular   Fluid Consistency:  Thin               Activity at Discharge:   Activity Instructions     Activity as Tolerated                   Follow-up Appointments:   LAWRENCE Castillo in 1 week after discharge from [facility].   LAWRENCE Paniagua in 2 weeks after discharge from [facility].    Test Results Pending at Discharge: None.     Hal Aguilar DO  12/22/17  8:51 AM    Time: 35 minutes.